# Patient Record
Sex: FEMALE | Race: WHITE | URBAN - METROPOLITAN AREA
[De-identification: names, ages, dates, MRNs, and addresses within clinical notes are randomized per-mention and may not be internally consistent; named-entity substitution may affect disease eponyms.]

---

## 2019-07-31 LAB — CREATININE, EXTERNAL: 1.02

## 2019-12-20 LAB — CREATININE, EXTERNAL: 1.01

## 2020-01-03 ENCOUNTER — HOSPITAL ENCOUNTER (OUTPATIENT)
Dept: LAB | Age: 75
Discharge: HOME | End: 2020-01-03

## 2020-01-03 ENCOUNTER — OFFICE VISIT (OUTPATIENT)
Dept: ENDOCRINOLOGY | Age: 75
End: 2020-01-03

## 2020-01-03 VITALS
BODY MASS INDEX: 33.13 KG/M2 | RESPIRATION RATE: 18 BRPM | OXYGEN SATURATION: 97 % | HEIGHT: 62 IN | HEART RATE: 52 BPM | TEMPERATURE: 96.8 F | SYSTOLIC BLOOD PRESSURE: 136 MMHG | WEIGHT: 180 LBS | DIASTOLIC BLOOD PRESSURE: 65 MMHG

## 2020-01-03 DIAGNOSIS — E03.8 SUBCLINICAL HYPOTHYROIDISM: ICD-10-CM

## 2020-01-03 DIAGNOSIS — E55.9 VITAMIN D DEFICIENCY: ICD-10-CM

## 2020-01-03 DIAGNOSIS — E83.52 HYPERCALCEMIA: ICD-10-CM

## 2020-01-03 DIAGNOSIS — E21.0 HYPERPARATHYROIDISM, PRIMARY (HCC): ICD-10-CM

## 2020-01-03 DIAGNOSIS — E83.52 HYPERCALCEMIA: Primary | ICD-10-CM

## 2020-01-03 RX ORDER — BUMETANIDE 1 MG/1
1 TABLET ORAL DAILY
COMMUNITY
Start: 2020-01-02

## 2020-01-03 RX ORDER — ASPIRIN 81 MG/1
81 TABLET ORAL DAILY
COMMUNITY

## 2020-01-03 RX ORDER — MONTELUKAST SODIUM 10 MG/1
1 TABLET ORAL DAILY
COMMUNITY
Start: 2020-01-02

## 2020-01-03 RX ORDER — IPRATROPIUM BROMIDE AND ALBUTEROL 20; 100 UG/1; UG/1
1 SPRAY, METERED RESPIRATORY (INHALATION)
COMMUNITY
Start: 2020-01-02

## 2020-01-03 RX ORDER — DICYCLOMINE HYDROCHLORIDE 10 MG/1
10 CAPSULE ORAL AS NEEDED
COMMUNITY

## 2020-01-03 RX ORDER — METOPROLOL SUCCINATE 50 MG/1
2 TABLET, EXTENDED RELEASE ORAL DAILY
COMMUNITY
Start: 2020-01-02

## 2020-01-03 RX ORDER — SPIRONOLACTONE 25 MG/1
1 TABLET ORAL DAILY
COMMUNITY
Start: 2020-01-02

## 2020-01-03 RX ORDER — NITROGLYCERIN 0.4 MG/1
TABLET SUBLINGUAL
COMMUNITY
Start: 2020-01-02

## 2020-01-03 RX ORDER — MINERAL OIL
180 ENEMA (ML) RECTAL
COMMUNITY

## 2020-01-03 RX ORDER — CLOPIDOGREL BISULFATE 75 MG/1
1 TABLET ORAL DAILY
COMMUNITY
Start: 2020-01-02

## 2020-01-03 NOTE — PROGRESS NOTES
Catherine Rodriguez MD          Patient Information  Date:1/3/2020  Name : Monisha Jameson 75 y.o.     YOB: 1945         Referred by: Melina Arnold MD       Chief Complaint   Patient presents with   [facility] New Patient     referred for Thyroid and elevated calcium levels       History of present illness:    Monisha Jameson is a 75 y.o. female here for evaluation of hypercalcemia. She was found to have calcium elevation over the last 2 years, calcium has been ranging from 10.2-10.5, upper limit of normal is 10.4. Calcium from December 2019 was 9.8. Mild CKD   when calcium was 10.4 (8.6-10.4), September 2019  She is off vitamin D supplements for the last 6 months. She has also decreased dairy intake  History of coronary artery disease, status post CABG, 3 MIs, status post pacemaker, CHF  On diuretics      No h/o HCTZ,lithium,theophylline usage recently, was on hydrochlorothiazide in the past  No h/o kidney stones,PUD    No h/o osteoporosis,fragility fractures  No family h/o of calcium disorders or nephrolithiasis or [facility]  DXA scan -done by gynecologist    No myalgia  Complains of shortness of breath on exertion, limited activity, weight gain      BP Readings from Last 3 Encounters:   01/03/20 136/65       Wt Readings from Last 3 Encounters:   01/03/20 180 lb (81.6 kg)       Past Medical History:   Diagnosis Date    Asthma 2000    Heart attack ([facility] [address] 2018    x3    History of shingles 04/2018    Hypercalcemia     Hypoglycemia     Vitamin D deficiency        Current Outpatient Medications   Medication Sig    bumetanide (BUMEX) 1 mg tablet Take 1 Tab by mouth daily.  clopidogrel (PLAVIX) 75 mg tab Take 1 Tab by mouth daily.  COMBIVENT RESPIMAT  mcg/actuation inhaler Take 1 Puff by inhalation three (3) times daily as needed.  metoprolol succinate (TOPROL-XL) 50 mg XL tablet Take 2 Tabs by mouth daily.     montelukast (SINGULAIR) 10 mg tablet Take 1 Tab by mouth daily.  nitroglycerin (NITROSTAT) 0.4 mg SL tablet     spironolactone (ALDACTONE) 25 mg tablet Take 1 Tab by mouth daily.  aspirin delayed-release 81 mg tablet Take 81 mg by mouth daily.  dicyclomine (BENTYL) 10 mg capsule Take 10 mg by mouth as needed.  fexofenadine (ALLEGRA) 180 mg tablet Take 180 mg by mouth daily as needed for Allergies.  ferrous fumarate/vit Bcomp,C (SUPER B COMPLEX PO) Take 1 Tab by mouth daily.  cholecalciferol, vitamin D3, (VITAMIN D3 PO) 2 weekly    BIOTIN PO Take 1 Tab by mouth daily. No current facility-administered medications for this visit. Allergies   Allergen Reactions    Ancef [Cefazolin] Hives    Cefprozil Hives    Penicillins Hives    Sulfur Dioxide Swelling    Codeine Nausea and Vomiting    Levofloxacin Myalgia    Rabeprazole Rash    Tape [Adhesive] Other (comments)     blisters         Review of Systems:  - Review of all symptoms negative other than mentioned in HPI    Physical Examination:  Blood pressure 136/65, pulse (!) 52, temperature 96.8 °F (36 °C), temperature source Oral, resp. rate 18, height 5' 1.5\" (1.562 m), weight 180 lb (81.6 kg), SpO2 97 %. - General: pleasant, no distress, good eye contact  - HEENT: no pallor,EOMI  - Neck: supple, no thyromegaly,no lymphadenopathy.   - Cardiovascular: regular, normal rate, normal S1 and S2, no murmurs  - Respiratory: clear to auscultation bilaterally  - Gastrointestinal: soft, nontender, nondistended  - Musculoskeletal: no proximal muscle weakness in upper or lower extremities  - Integumentary: no edema,  - Neurological: alert and oriented,no focal neurological deficits  - Psychiatric: normal mood and affect    Data Reviewed:     No results found for: CA, CAP, PTH, PHOS, PHOSU1  No results found for: VITD3, XQVID2, XQVID3, XQVID, VD3RIA    No results found for: TSH, TSH2, TSH3, TSHP, TSHEXT, TSHEXT  No results found for: NA, K, CL, CO2, AGAP, GLU, BUN, CREA, BYRON Bonds, CA, Gabriela Srivastava    [] Reviewed labs    Assessment/Plan:     Hypercalcemia: Based on the blood test she could have mild primary hyperparathyroidism, calcium levels likely fluctuating depending on fluid volume/hydration. She is on antidiuretics  Calcium has been mildly elevated, parathyroid levels are higher for the mild elevation of calcium, need to check vitamin D, 24-hour urine calcium along with creatinine, repeat calcium with albumin, PTH, need to rule out coexisting secondary causes for elevated PTH also  Requested the DEXA report from gynecologist  No nephrolithiasis, renal parameters stable, no known osteoporosis  At present no indications for surgery    Vitamin D deficiency: We will check vitamin D first, if the levels are normal then she can go ahead and collect 24-hour urine calcium. If vitamin D levels are low, need to replete vitamin D then repeat the blood test as well as collect 24-hour urine calcium, low vitamin D can lower calcium excretion through the urine    CAD:    Subclinical hypothyroidism: Recheck TSH, free T4  TSH is very minimally elevated, no treatment necessary    She spends most of the time in [place], she will have the labs done there at [facility]    Patient Instructions   24 hour urine collection instructions    On the day you plan to collect urine    1. Discard first urine sample soon after you get up    2. Start collecting urine samples in the container then on whole first day . Andrew Cueva ... 3. until the first sample next day is collected into the jar. Wait till you hear from us about the test result before collecting the urine           Thank you for allowing me to participate in the care of this patient. Nayla Hernandez MD        Patient Jeanie Omalley verbalized understanding   Voice-recognition software was used to generate this report, which may result in some phonetic-based errors in the grammar and contents.   Even though attempts were made to correct all the mistakes, some may have been missed and remained in the body of the report.

## 2020-01-03 NOTE — LETTER
1/3/20 Patient: Jacki Arita YOB: 1945 Date of Visit: 1/3/2020 Tracy Foster MD 
[address] [facility] [address] VIA Facsimile: [phone] Dear Tracy Foster MD, Thank you for referring Ms. Thom Sauer to [facility] for evaluation. My notes for this consultation are attached. If you have questions, please do not hesitate to call me. I look forward to following your patient along with you. Sincerely, Sydni Agee MD

## 2020-01-03 NOTE — PATIENT INSTRUCTIONS
24 hour urine collection instructions    On the day you plan to collect urine    1. Discard first urine sample soon after you get up    2. Start collecting urine samples in the container then on whole first day . Thor Estrada ... 3. until the first sample next day is collected into the jar.           Wait till you hear from us about the test result before collecting the urine

## 2020-01-03 NOTE — PROGRESS NOTES
Alvina Jackson is a 75 y.o. female here for   Chief Complaint   Patient presents with    New Patient     referred for Thyroid and elevated calcium levels       1. Have you been to the ER, urgent care clinic since your last visit? Hospitalized since your last visit? -n/a    2. Have you seen or consulted any other health care providers outside of the [address] [place] [address] since your last visit?   Include any pap smears or colon screening.-n/a

## 2020-01-04 LAB — 25(OH)D3 SERPL-MCNC: 22.6 NG/ML (ref 30–100)

## 2020-01-05 NOTE — PROGRESS NOTES
Vitamin D is low, I would recommend vitamin D over-the-counter 1000 units a day. It will take 4 weeks for the vitamin D to improve, after that she can collect the 24-hour urine and complete rest of the blood test at [place].

## 2020-01-07 ENCOUNTER — TELEPHONE (OUTPATIENT)
Dept: ENDOCRINOLOGY | Age: 75
End: 2020-01-07

## 2020-01-07 NOTE — TELEPHONE ENCOUNTER
Per Dr. Carrie Mora, informed pt of result note, as noted above. Pt verbalized understanding and stated it does work out as she has to [employer] surgery soon. She wanted to know if Dr. Carrie Mora was aware that she needed heart surgery for her Mitral valve leaking and losing too much blood, she called up here yesterday. Informed her I do see the msg in here and it was sent to Dr. Carrie Mora. No further questions or concerns at this time.

## 2020-01-07 NOTE — TELEPHONE ENCOUNTER
----- Message from Callie Hart MD sent at 1/4/2020  9:29 PM EST -----  Vitamin D is low, I would recommend vitamin D over-the-counter 1000 units a day. It will take 4 weeks for the vitamin D to improve, after that she can collect the 24-hour urine and complete rest of the blood test at [place].

## 2025-02-06 ENCOUNTER — APPOINTMENT (OUTPATIENT)
Facility: HOSPITAL | Age: 80
DRG: 280 | End: 2025-02-06
Payer: MEDICARE

## 2025-02-06 ENCOUNTER — HOSPITAL ENCOUNTER (INPATIENT)
Facility: HOSPITAL | Age: 80
LOS: 3 days | Discharge: HOME | DRG: 280 | End: 2025-02-10
Attending: STUDENT IN AN ORGANIZED HEALTH CARE EDUCATION/TRAINING PROGRAM | Admitting: INTERNAL MEDICINE
Payer: MEDICARE

## 2025-02-06 DIAGNOSIS — R07.9 CHEST PAIN WITH HIGH RISK FOR CARDIAC ETIOLOGY: ICD-10-CM

## 2025-02-06 DIAGNOSIS — R07.9 CHEST PAIN, UNSPECIFIED TYPE: Primary | ICD-10-CM

## 2025-02-06 LAB
ALBUMIN SERPL-MCNC: 3.2 G/DL (ref 3.5–5)
ALBUMIN/GLOB SERPL: 1 (ref 1.1–2.2)
ALP SERPL-CCNC: 50 U/L (ref 45–117)
ALT SERPL-CCNC: 46 U/L (ref 12–78)
ANION GAP SERPL CALC-SCNC: 7 MMOL/L (ref 2–12)
ANION GAP SERPL CALC-SCNC: 7 MMOL/L (ref 2–12)
AST SERPL W P-5'-P-CCNC: 61 U/L (ref 15–37)
BASE EXCESS BLD CALC-SCNC: 1.4 MMOL/L
BASOPHILS # BLD: 0.05 K/UL (ref 0–0.1)
BASOPHILS NFR BLD: 0.6 % (ref 0–1)
BILIRUB SERPL-MCNC: 0.8 MG/DL (ref 0.2–1)
BUN SERPL-MCNC: 10 MG/DL (ref 6–20)
BUN SERPL-MCNC: 9 MG/DL (ref 6–20)
BUN/CREAT SERPL: 9 (ref 12–20)
BUN/CREAT SERPL: 9 (ref 12–20)
CA-I BLD-MCNC: 1.3 MMOL/L (ref 1.12–1.32)
CA-I BLD-MCNC: 9.8 MG/DL (ref 8.5–10.1)
CA-I BLD-MCNC: 9.8 MG/DL (ref 8.5–10.1)
CHLORIDE BLD-SCNC: 107 MMOL/L (ref 98–107)
CHLORIDE SERPL-SCNC: 111 MMOL/L (ref 97–108)
CHLORIDE SERPL-SCNC: 112 MMOL/L (ref 97–108)
CO2 BLD-SCNC: 26 MMOL/L
CO2 SERPL-SCNC: 22 MMOL/L (ref 21–32)
CO2 SERPL-SCNC: 25 MMOL/L (ref 21–32)
CREAT SERPL-MCNC: 0.99 MG/DL (ref 0.55–1.02)
CREAT SERPL-MCNC: 1.11 MG/DL (ref 0.55–1.02)
CREAT UR-MCNC: 0.86 MG/DL (ref 0.6–1.3)
DIFFERENTIAL METHOD BLD: ABNORMAL
EOSINOPHIL # BLD: 0.14 K/UL (ref 0–0.4)
EOSINOPHIL NFR BLD: 1.6 % (ref 0–7)
ERYTHROCYTE [DISTWIDTH] IN BLOOD BY AUTOMATED COUNT: 15.4 % (ref 11.5–14.5)
FLUAV RNA SPEC QL NAA+PROBE: NOT DETECTED
FLUBV RNA SPEC QL NAA+PROBE: NOT DETECTED
GLOBULIN SER CALC-MCNC: 3.2 G/DL (ref 2–4)
GLUCOSE BLD STRIP.AUTO-MCNC: 201 MG/DL (ref 65–100)
GLUCOSE SERPL-MCNC: 108 MG/DL (ref 65–100)
GLUCOSE SERPL-MCNC: 178 MG/DL (ref 65–100)
HCO3 BLD-SCNC: 26.6 MMOL/L (ref 19–28)
HCT VFR BLD AUTO: 35.6 % (ref 35–47)
HGB BLD-MCNC: 10.5 G/DL (ref 11.5–16)
IMM GRANULOCYTES # BLD AUTO: 0.01 K/UL (ref 0–0.04)
IMM GRANULOCYTES NFR BLD AUTO: 0.1 % (ref 0–0.5)
LACTATE BLD-SCNC: 2.01 MMOL/L (ref 0.4–2)
LYMPHOCYTES # BLD: 2.71 K/UL (ref 0.8–3.5)
LYMPHOCYTES NFR BLD: 31.8 % (ref 12–49)
MAGNESIUM SERPL-MCNC: 2 MG/DL (ref 1.6–2.4)
MCH RBC QN AUTO: 26.4 PG (ref 26–34)
MCHC RBC AUTO-ENTMCNC: 29.5 G/DL (ref 30–36.5)
MCV RBC AUTO: 89.4 FL (ref 80–99)
MONOCYTES # BLD: 0.73 K/UL (ref 0–1)
MONOCYTES NFR BLD: 8.6 % (ref 5–13)
NEUTS SEG # BLD: 4.88 K/UL (ref 1.8–8)
NEUTS SEG NFR BLD: 57.3 % (ref 32–75)
NRBC # BLD: 0 K/UL (ref 0–0.01)
NRBC BLD-RTO: 0 PER 100 WBC
PCO2 BLD: 43.7 MMHG (ref 35–45)
PERFORMED BY:: ABNORMAL
PH BLD: 7.39 (ref 7.35–7.45)
PLATELET # BLD AUTO: 306 K/UL (ref 150–400)
PMV BLD AUTO: 10.7 FL (ref 8.9–12.9)
PO2 BLD: <27 MMHG (ref 75–100)
POTASSIUM BLD-SCNC: 3.4 MMOL/L (ref 3.5–5.5)
POTASSIUM SERPL-SCNC: 3.4 MMOL/L (ref 3.5–5.1)
POTASSIUM SERPL-SCNC: 4.7 MMOL/L (ref 3.5–5.1)
PROT SERPL-MCNC: 6.4 G/DL (ref 6.4–8.2)
RBC # BLD AUTO: 3.98 M/UL (ref 3.8–5.2)
SARS-COV-2 RNA RESP QL NAA+PROBE: NOT DETECTED
SODIUM BLD-SCNC: 146 MMOL/L (ref 136–145)
SODIUM SERPL-SCNC: 141 MMOL/L (ref 136–145)
SODIUM SERPL-SCNC: 143 MMOL/L (ref 136–145)
SPECIMEN SITE: ABNORMAL
TROPONIN I SERPL HS-MCNC: 147 NG/L (ref 0–51)
TROPONIN I SERPL HS-MCNC: 22 NG/L (ref 0–51)
TROPONIN I SERPL HS-MCNC: 74 NG/L (ref 0–51)
WBC # BLD AUTO: 8.5 K/UL (ref 3.6–11)

## 2025-02-06 PROCEDURE — 71045 X-RAY EXAM CHEST 1 VIEW: CPT

## 2025-02-06 PROCEDURE — 80048 BASIC METABOLIC PNL TOTAL CA: CPT

## 2025-02-06 PROCEDURE — 99285 EMERGENCY DEPT VISIT HI MDM: CPT

## 2025-02-06 PROCEDURE — 96374 THER/PROPH/DIAG INJ IV PUSH: CPT

## 2025-02-06 PROCEDURE — 71275 CT ANGIOGRAPHY CHEST: CPT

## 2025-02-06 PROCEDURE — 85025 COMPLETE CBC W/AUTO DIFF WBC: CPT

## 2025-02-06 PROCEDURE — 82947 ASSAY GLUCOSE BLOOD QUANT: CPT

## 2025-02-06 PROCEDURE — 94640 AIRWAY INHALATION TREATMENT: CPT

## 2025-02-06 PROCEDURE — 82330 ASSAY OF CALCIUM: CPT

## 2025-02-06 PROCEDURE — G0378 HOSPITAL OBSERVATION PER HR: HCPCS

## 2025-02-06 PROCEDURE — G0378 HOSPITAL OBSERVATION PER HR: HCPCS | Performed by: INTERNAL MEDICINE

## 2025-02-06 PROCEDURE — 83605 ASSAY OF LACTIC ACID: CPT

## 2025-02-06 PROCEDURE — 82803 BLOOD GASES ANY COMBINATION: CPT

## 2025-02-06 PROCEDURE — 84295 ASSAY OF SERUM SODIUM: CPT

## 2025-02-06 PROCEDURE — 6370000000 HC RX 637 (ALT 250 FOR IP): Performed by: INTERNAL MEDICINE

## 2025-02-06 PROCEDURE — 84132 ASSAY OF SERUM POTASSIUM: CPT

## 2025-02-06 PROCEDURE — 6360000004 HC RX CONTRAST MEDICATION: Performed by: INTERNAL MEDICINE

## 2025-02-06 PROCEDURE — 93005 ELECTROCARDIOGRAM TRACING: CPT | Performed by: STUDENT IN AN ORGANIZED HEALTH CARE EDUCATION/TRAINING PROGRAM

## 2025-02-06 PROCEDURE — 2500000003 HC RX 250 WO HCPCS: Performed by: INTERNAL MEDICINE

## 2025-02-06 PROCEDURE — 83735 ASSAY OF MAGNESIUM: CPT

## 2025-02-06 PROCEDURE — 87636 SARSCOV2 & INF A&B AMP PRB: CPT

## 2025-02-06 PROCEDURE — 80053 COMPREHEN METABOLIC PANEL: CPT

## 2025-02-06 PROCEDURE — 84484 ASSAY OF TROPONIN QUANT: CPT

## 2025-02-06 PROCEDURE — 36415 COLL VENOUS BLD VENIPUNCTURE: CPT

## 2025-02-06 PROCEDURE — 6360000002 HC RX W HCPCS: Performed by: INTERNAL MEDICINE

## 2025-02-06 RX ORDER — IPRATROPIUM BROMIDE AND ALBUTEROL SULFATE 2.5; .5 MG/3ML; MG/3ML
1 SOLUTION RESPIRATORY (INHALATION)
Status: DISCONTINUED | OUTPATIENT
Start: 2025-02-06 | End: 2025-02-07

## 2025-02-06 RX ORDER — VIT B COMPLX C/FOLIC ACID/ZINC 0.8MG-15MG
1 TABLET ORAL NIGHTLY
Status: DISCONTINUED | OUTPATIENT
Start: 2025-02-06 | End: 2025-02-06

## 2025-02-06 RX ORDER — POTASSIUM CHLORIDE 1500 MG/1
40 TABLET, EXTENDED RELEASE ORAL PRN
Status: DISCONTINUED | OUTPATIENT
Start: 2025-02-06 | End: 2025-02-10 | Stop reason: HOSPADM

## 2025-02-06 RX ORDER — METOPROLOL SUCCINATE 25 MG/1
75 TABLET, EXTENDED RELEASE ORAL 2 TIMES DAILY
Status: DISCONTINUED | OUTPATIENT
Start: 2025-02-06 | End: 2025-02-10 | Stop reason: HOSPADM

## 2025-02-06 RX ORDER — ALLOPURINOL 100 MG/1
100 TABLET ORAL DAILY
COMMUNITY

## 2025-02-06 RX ORDER — ENOXAPARIN SODIUM 100 MG/ML
40 INJECTION SUBCUTANEOUS DAILY
Status: DISCONTINUED | OUTPATIENT
Start: 2025-02-07 | End: 2025-02-09

## 2025-02-06 RX ORDER — VIT B COMPLX C/FOLIC ACID/ZINC 0.8MG-15MG
1 TABLET ORAL NIGHTLY
COMMUNITY

## 2025-02-06 RX ORDER — FEXOFENADINE HCL 180 MG/1
180 TABLET ORAL DAILY
COMMUNITY

## 2025-02-06 RX ORDER — MORPHINE SULFATE 2 MG/ML
4 INJECTION, SOLUTION INTRAMUSCULAR; INTRAVENOUS
Status: ACTIVE | OUTPATIENT
Start: 2025-02-06 | End: 2025-02-07

## 2025-02-06 RX ORDER — ONDANSETRON 2 MG/ML
4 INJECTION INTRAMUSCULAR; INTRAVENOUS EVERY 6 HOURS PRN
Status: DISCONTINUED | OUTPATIENT
Start: 2025-02-06 | End: 2025-02-10 | Stop reason: HOSPADM

## 2025-02-06 RX ORDER — ASPIRIN 81 MG/1
81 TABLET ORAL NIGHTLY
Status: DISCONTINUED | OUTPATIENT
Start: 2025-02-06 | End: 2025-02-09

## 2025-02-06 RX ORDER — CETIRIZINE HYDROCHLORIDE 10 MG/1
10 TABLET ORAL DAILY
Status: DISCONTINUED | OUTPATIENT
Start: 2025-02-07 | End: 2025-02-10 | Stop reason: HOSPADM

## 2025-02-06 RX ORDER — LISINOPRIL 5 MG/1
10 TABLET ORAL DAILY
Status: DISCONTINUED | OUTPATIENT
Start: 2025-02-07 | End: 2025-02-10 | Stop reason: HOSPADM

## 2025-02-06 RX ORDER — IPRATROPIUM BROMIDE AND ALBUTEROL SULFATE 2.5; .5 MG/3ML; MG/3ML
1 SOLUTION RESPIRATORY (INHALATION) EVERY 4 HOURS PRN
Status: DISCONTINUED | OUTPATIENT
Start: 2025-02-06 | End: 2025-02-10 | Stop reason: HOSPADM

## 2025-02-06 RX ORDER — POLYETHYLENE GLYCOL 3350 17 G/17G
17 POWDER, FOR SOLUTION ORAL DAILY PRN
Status: DISCONTINUED | OUTPATIENT
Start: 2025-02-06 | End: 2025-02-10 | Stop reason: HOSPADM

## 2025-02-06 RX ORDER — CLOPIDOGREL BISULFATE 75 MG/1
75 TABLET ORAL NIGHTLY
COMMUNITY

## 2025-02-06 RX ORDER — ONDANSETRON 4 MG/1
4 TABLET, ORALLY DISINTEGRATING ORAL EVERY 8 HOURS PRN
Status: DISCONTINUED | OUTPATIENT
Start: 2025-02-06 | End: 2025-02-10 | Stop reason: HOSPADM

## 2025-02-06 RX ORDER — LISINOPRIL 10 MG/1
10 TABLET ORAL DAILY
Status: ON HOLD | COMMUNITY
End: 2025-02-10 | Stop reason: HOSPADM

## 2025-02-06 RX ORDER — CLOPIDOGREL BISULFATE 75 MG/1
75 TABLET ORAL NIGHTLY
Status: DISCONTINUED | OUTPATIENT
Start: 2025-02-06 | End: 2025-02-10 | Stop reason: HOSPADM

## 2025-02-06 RX ORDER — FAMOTIDINE 20 MG/1
1 TABLET, FILM COATED ORAL NIGHTLY PRN
COMMUNITY

## 2025-02-06 RX ORDER — SPIRONOLACTONE 25 MG/1
25 TABLET ORAL DAILY
COMMUNITY

## 2025-02-06 RX ORDER — SODIUM CHLORIDE 9 MG/ML
INJECTION, SOLUTION INTRAVENOUS PRN
Status: DISCONTINUED | OUTPATIENT
Start: 2025-02-06 | End: 2025-02-10 | Stop reason: HOSPADM

## 2025-02-06 RX ORDER — SPIRONOLACTONE 25 MG/1
25 TABLET ORAL DAILY
Status: DISCONTINUED | OUTPATIENT
Start: 2025-02-07 | End: 2025-02-10 | Stop reason: HOSPADM

## 2025-02-06 RX ORDER — FAMOTIDINE 20 MG/1
20 TABLET, FILM COATED ORAL NIGHTLY PRN
Status: DISCONTINUED | OUTPATIENT
Start: 2025-02-06 | End: 2025-02-10 | Stop reason: HOSPADM

## 2025-02-06 RX ORDER — ACETAMINOPHEN 650 MG/1
650 SUPPOSITORY RECTAL EVERY 6 HOURS PRN
Status: DISCONTINUED | OUTPATIENT
Start: 2025-02-06 | End: 2025-02-10 | Stop reason: HOSPADM

## 2025-02-06 RX ORDER — FUROSEMIDE 10 MG/ML
20 INJECTION INTRAMUSCULAR; INTRAVENOUS 2 TIMES DAILY
Status: DISCONTINUED | OUTPATIENT
Start: 2025-02-06 | End: 2025-02-09

## 2025-02-06 RX ORDER — ACETAMINOPHEN 325 MG/1
650 TABLET ORAL EVERY 6 HOURS PRN
Status: DISCONTINUED | OUTPATIENT
Start: 2025-02-06 | End: 2025-02-10 | Stop reason: HOSPADM

## 2025-02-06 RX ORDER — DILTIAZEM HYDROCHLORIDE 120 MG/1
120 CAPSULE, COATED, EXTENDED RELEASE ORAL DAILY
Status: ON HOLD | COMMUNITY
End: 2025-02-10 | Stop reason: HOSPADM

## 2025-02-06 RX ORDER — NITROGLYCERIN 0.4 MG/1
0.4 TABLET SUBLINGUAL EVERY 5 MIN PRN
Status: DISCONTINUED | OUTPATIENT
Start: 2025-02-06 | End: 2025-02-10 | Stop reason: HOSPADM

## 2025-02-06 RX ORDER — SODIUM CHLORIDE 0.9 % (FLUSH) 0.9 %
5-40 SYRINGE (ML) INJECTION EVERY 12 HOURS SCHEDULED
Status: DISCONTINUED | OUTPATIENT
Start: 2025-02-06 | End: 2025-02-10 | Stop reason: HOSPADM

## 2025-02-06 RX ORDER — METOPROLOL SUCCINATE 50 MG/1
1.5 TABLET, EXTENDED RELEASE ORAL 2 TIMES DAILY
Status: ON HOLD | COMMUNITY
Start: 2024-03-04 | End: 2025-02-07 | Stop reason: HOSPADM

## 2025-02-06 RX ORDER — MAGNESIUM SULFATE IN WATER 40 MG/ML
2000 INJECTION, SOLUTION INTRAVENOUS PRN
Status: DISCONTINUED | OUTPATIENT
Start: 2025-02-06 | End: 2025-02-10 | Stop reason: HOSPADM

## 2025-02-06 RX ORDER — ALBUTEROL SULFATE 2.5 MG/.5ML
2.5 SOLUTION RESPIRATORY (INHALATION) EVERY 6 HOURS PRN
Status: DISCONTINUED | OUTPATIENT
Start: 2025-02-06 | End: 2025-02-10 | Stop reason: HOSPADM

## 2025-02-06 RX ORDER — DILTIAZEM HYDROCHLORIDE 120 MG/1
120 CAPSULE, COATED, EXTENDED RELEASE ORAL DAILY
Status: DISCONTINUED | OUTPATIENT
Start: 2025-02-06 | End: 2025-02-10 | Stop reason: HOSPADM

## 2025-02-06 RX ORDER — POTASSIUM CHLORIDE 7.45 MG/ML
10 INJECTION INTRAVENOUS PRN
Status: DISCONTINUED | OUTPATIENT
Start: 2025-02-06 | End: 2025-02-10 | Stop reason: HOSPADM

## 2025-02-06 RX ORDER — DILTIAZEM HYDROCHLORIDE 30 MG/1
30 TABLET, FILM COATED ORAL EVERY 6 HOURS SCHEDULED
Status: DISCONTINUED | OUTPATIENT
Start: 2025-02-06 | End: 2025-02-09

## 2025-02-06 RX ORDER — ASPIRIN 81 MG/1
81 TABLET ORAL NIGHTLY
COMMUNITY

## 2025-02-06 RX ORDER — ALLOPURINOL 100 MG/1
100 TABLET ORAL DAILY
Status: DISCONTINUED | OUTPATIENT
Start: 2025-02-07 | End: 2025-02-10 | Stop reason: HOSPADM

## 2025-02-06 RX ORDER — VITAMIN B COMPLEX
1000 TABLET ORAL DAILY
Status: DISCONTINUED | OUTPATIENT
Start: 2025-02-07 | End: 2025-02-10

## 2025-02-06 RX ORDER — SODIUM CHLORIDE 0.9 % (FLUSH) 0.9 %
5-40 SYRINGE (ML) INJECTION PRN
Status: DISCONTINUED | OUTPATIENT
Start: 2025-02-06 | End: 2025-02-10 | Stop reason: HOSPADM

## 2025-02-06 RX ORDER — IOPAMIDOL 755 MG/ML
100 INJECTION, SOLUTION INTRAVASCULAR
Status: COMPLETED | OUTPATIENT
Start: 2025-02-06 | End: 2025-02-06

## 2025-02-06 RX ADMIN — DILTIAZEM HYDROCHLORIDE 120 MG: 120 CAPSULE, COATED, EXTENDED RELEASE ORAL at 18:12

## 2025-02-06 RX ADMIN — ASPIRIN 81 MG: 81 TABLET, COATED ORAL at 20:22

## 2025-02-06 RX ADMIN — IOPAMIDOL 100 ML: 755 INJECTION, SOLUTION INTRAVENOUS at 16:34

## 2025-02-06 RX ADMIN — IPRATROPIUM BROMIDE AND ALBUTEROL SULFATE 1 DOSE: 2.5; .5 SOLUTION RESPIRATORY (INHALATION) at 22:10

## 2025-02-06 RX ADMIN — FUROSEMIDE 20 MG: 10 INJECTION, SOLUTION INTRAMUSCULAR; INTRAVENOUS at 22:02

## 2025-02-06 RX ADMIN — CLOPIDOGREL BISULFATE 75 MG: 75 TABLET ORAL at 20:22

## 2025-02-06 RX ADMIN — SODIUM CHLORIDE, PRESERVATIVE FREE 10 ML: 5 INJECTION INTRAVENOUS at 20:22

## 2025-02-06 RX ADMIN — DILTIAZEM HYDROCHLORIDE 30 MG: 30 TABLET, FILM COATED ORAL at 21:57

## 2025-02-06 RX ADMIN — METOPROLOL SUCCINATE 75 MG: 25 TABLET, FILM COATED, EXTENDED RELEASE ORAL at 21:57

## 2025-02-06 ASSESSMENT — PAIN SCALES - GENERAL
PAINLEVEL_OUTOF10: 2
PAINLEVEL_OUTOF10: 0

## 2025-02-06 NOTE — H&P
Assessment:    Acute on Chronic Respiratory Failure/Shortness of Breath.  Asthma exacerbation.  Possible Acute Heart Failure with notable pulmonary edema on Chest X-ray.  Back Pain.  Possible anginal equivalent.  NSTEMI; Type II.  Supply/Demand Mismatch in setting of tachycardia/hypoxemia.  History of Coronary Artery Disease.  Initial myocardial infarction in 1961.  Status Post NSTEMI 1/2018.  Status Post CABG 2018.  History of Pacemaker Placement, 2018 post cardiac arrest.  History of Hypertension.  History of Hyperlipidemia.  Patient has not responded to or tolerated statins.  History of Breast Cancer.  Treated with masectomy.  Chronic Kidney Disease.  Peripheral Vascular Disease.  History of bilateral carotid artery disease.    Plan:    Admit.  Respiratory Support.  Standing/p.r.n. bronchodilator therapy.  Check Influenza/Covid.  Trend Troponin.  IV Lasix.  Continue ASA and Plavix.  Continue beta blocker, ace and spironolactone.  DVT prophylaxis: Lovenox.    CODE STATUS: Patient is full code.    Home medications were reviewed.    Time spent: 75 minutes.    Discussion/Medical Decision Making: Patient with numerous medical comorbidities, each with increased risk for mortality and morbidity if left untreated.   I have reviewed patient's presenting subjective and objective findings, as well as all laboratory studies, imaging studies, and vital signs to date as well as treatment rendered  and patient's response to those treatments.  In addition, prior medical, surgical and relevant social and family histories were reviewed.    I have discussed patient's presentation/findings and clinical course to date with ED provider. Given the patient's current clinical presentation, I have a high level of concern for decompensation if discharged from the emergency department and that patient warrants admission to hospital.

## 2025-02-06 NOTE — ED NOTES
ED TO INPATIENT SBAR HANDOFF    Patient Name: Concepción Maki   Preferred Name: Concepción  : 1945  79 y.o.   Family/Caregiver Present: no   Code Status Order: No Order  PO Status: NPO:No  Telemetry Order: Yes  C-SSRS: Risk of Suicide: No Risk  Sitter no   Restraints:     Sepsis Risk Score      Situation  Chief Complaint   Patient presents with    Tachycardia     Pt BIBA from home due to SVT per ems, pt c/o back pain, pt found to be SVT rate of 175's, pr arrivals afib rvr w rate of 121. GCS 15. Cardiac hx per pt. Nitro x1 PTA.      Brief Description of Patient's Condition: Pt brought due to back pain, pt found to be SVT per ems, upon arrival pt afib rvr, pt back pain has resolved. Pt HR around 112 afib on monitor. Pt admit due to 2nd trop elevated. Extensive cardiac hx- mulitple MI's, GCS 15. Ambulatory. CTA PE study done- not resulted @ this time.   Mental Status: oriented and alert  Arrived from:Home  Imaging:   XR CHEST PORTABLE   Final Result      Mild to moderate edema pattern. Hazy right mid to lower lung   atelectasis/airspace disease. Moderate right and small left pleural effusions.          Electronically signed by MC PASTOR      CTA CHEST W WO CONTRAST PE Eval    (Results Pending)     Abnormal labs:   Abnormal Labs Reviewed   BASIC METABOLIC PANEL - Abnormal; Notable for the following components:       Result Value    Potassium 3.4 (*)     Chloride 111 (*)     Glucose 178 (*)     Creatinine 1.11 (*)     BUN/Creatinine Ratio 9 (*)     Est, Glom Filt Rate 50 (*)     All other components within normal limits   CBC WITH AUTO DIFFERENTIAL - Abnormal; Notable for the following components:    Hemoglobin 10.5 (*)     MCHC 29.5 (*)     RDW 15.4 (*)     All other components within normal limits   TROPONIN - Abnormal; Notable for the following components:    Troponin, High Sensitivity 74 (*)     All other components within normal limits   POC BLOOD GAS AND CHEMISTRY - Abnormal; Notable for the following

## 2025-02-06 NOTE — ED PROVIDER NOTES
POC Creatinine 0.86 0.6 - 1.3 MG/DL    eGFR, POC 68 >60 ml/min/1.73m2    Base Excess 1.4 mmol/L    POC HCO3 26.6 19.0 - 28.0 mmol/L    POC TCO2 26 MMOL/L    Source Venous      Performed by: REBECA ROBLES     POC Lactic Acid 2.01 (HH) 0.40 - 2.00 mmol/L   EKG 12 Lead    Collection Time: 02/06/25  1:43 PM   Result Value Ref Range    Ventricular Rate 105 BPM    Atrial Rate 136 BPM    QRS Duration 132 ms    Q-T Interval 396 ms    QTc Calculation (Bazett) 523 ms    R Axis 102 degrees    T Axis 30 degrees    Diagnosis       Atrial fibrillation with rapid ventricular response with premature   ventricular or aberrantly conducted complexes  Right bundle branch block  Anteroseptal infarct , age undetermined  Abnormal ECG  No previous ECGs available  Confirmed by YULIA WONG MD ([id number]) on 2/7/2025 7:40:15 AM     Troponin    Collection Time: 02/06/25  3:03 PM   Result Value Ref Range    Troponin, High Sensitivity 74 (H) 0 - 51 ng/L   Troponin    Collection Time: 02/06/25  6:45 PM   Result Value Ref Range    Troponin, High Sensitivity 147 (HH) 0 - 51 ng/L   Comprehensive Metabolic Panel    Collection Time: 02/06/25  6:45 PM   Result Value Ref Range    Sodium 141 136 - 145 mmol/L    Potassium 4.7 3.5 - 5.1 mmol/L    Chloride 112 (H) 97 - 108 mmol/L    CO2 22 21 - 32 mmol/L    Anion Gap 7 2 - 12 mmol/L    Glucose 108 (H) 65 - 100 mg/dL    BUN 9 6 - 20 mg/dL    Creatinine 0.99 0.55 - 1.02 mg/dL    BUN/Creatinine Ratio 9 (L) 12 - 20      Est, Glom Filt Rate 58 (L) >60 ml/min/1.73m2    Calcium 9.8 8.5 - 10.1 mg/dL    Total Bilirubin 0.8 0.2 - 1.0 mg/dL    AST 61 (H) 15 - 37 U/L    ALT 46 12 - 78 U/L    Alk Phosphatase 50 45 - 117 U/L    Total Protein 6.4 6.4 - 8.2 g/dL    Albumin 3.2 (L) 3.5 - 5.0 g/dL    Globulin 3.2 2.0 - 4.0 g/dL    Albumin/Globulin Ratio 1.0 (L) 1.1 - 2.2     COVID-19 & Influenza Combo    Collection Time: 02/06/25  8:20 PM    Specimen: Nasopharyngeal   Result Value Ref Range    SARS-CoV-2, PCR Not Detected Not

## 2025-02-07 ENCOUNTER — APPOINTMENT (OUTPATIENT)
Facility: HOSPITAL | Age: 80
DRG: 280 | End: 2025-02-07
Attending: INTERNAL MEDICINE
Payer: MEDICARE

## 2025-02-07 ENCOUNTER — APPOINTMENT (OUTPATIENT)
Facility: HOSPITAL | Age: 80
DRG: 280 | End: 2025-02-07
Payer: MEDICARE

## 2025-02-07 PROBLEM — I50.9 ACUTE DECOMPENSATED HEART FAILURE (HCC): Status: ACTIVE | Noted: 2025-02-07

## 2025-02-07 LAB
ALBUMIN SERPL-MCNC: 3.5 G/DL (ref 3.5–5)
ALBUMIN/GLOB SERPL: 1.1 (ref 1.1–2.2)
ALP SERPL-CCNC: 55 U/L (ref 45–117)
ALT SERPL-CCNC: 46 U/L (ref 12–78)
ANION GAP SERPL CALC-SCNC: 9 MMOL/L (ref 2–12)
APPEARANCE FLD: ABNORMAL
AST SERPL W P-5'-P-CCNC: 40 U/L (ref 15–37)
BASOPHILS # BLD: 0.05 K/UL (ref 0–0.1)
BASOPHILS NFR BLD: 0.7 % (ref 0–1)
BILIRUB SERPL-MCNC: 0.9 MG/DL (ref 0.2–1)
BUN SERPL-MCNC: 8 MG/DL (ref 6–20)
BUN/CREAT SERPL: 7 (ref 12–20)
CA-I BLD-MCNC: 10.2 MG/DL (ref 8.5–10.1)
CHLORIDE SERPL-SCNC: 107 MMOL/L (ref 97–108)
CO2 SERPL-SCNC: 27 MMOL/L (ref 21–32)
COLOR FLD: YELLOW
CREAT SERPL-MCNC: 1.07 MG/DL (ref 0.55–1.02)
DIFFERENTIAL METHOD BLD: ABNORMAL
EKG ATRIAL RATE: 131 BPM
EKG ATRIAL RATE: 136 BPM
EKG DIAGNOSIS: NORMAL
EKG DIAGNOSIS: NORMAL
EKG Q-T INTERVAL: 366 MS
EKG Q-T INTERVAL: 396 MS
EKG QRS DURATION: 126 MS
EKG QRS DURATION: 132 MS
EKG QTC CALCULATION (BAZETT): 492 MS
EKG QTC CALCULATION (BAZETT): 523 MS
EKG R AXIS: 102 DEGREES
EKG R AXIS: 119 DEGREES
EKG T AXIS: 30 DEGREES
EKG T AXIS: 35 DEGREES
EKG VENTRICULAR RATE: 105 BPM
EKG VENTRICULAR RATE: 109 BPM
EOSINOPHIL # BLD: 0.17 K/UL (ref 0–0.4)
EOSINOPHIL NFR BLD: 2.2 % (ref 0–7)
ERYTHROCYTE [DISTWIDTH] IN BLOOD BY AUTOMATED COUNT: 15.5 % (ref 11.5–14.5)
GLOBULIN SER CALC-MCNC: 3.3 G/DL (ref 2–4)
GLUCOSE FLD-MCNC: 136 MG/DL
GLUCOSE SERPL-MCNC: 104 MG/DL (ref 65–100)
HCT VFR BLD AUTO: 36.2 % (ref 35–47)
HGB BLD-MCNC: 10.6 G/DL (ref 11.5–16)
IMM GRANULOCYTES # BLD AUTO: 0.02 K/UL (ref 0–0.04)
IMM GRANULOCYTES NFR BLD AUTO: 0.3 % (ref 0–0.5)
LDH FLD L TO P-CCNC: 68 U/L
LYMPHOCYTES # BLD: 1.69 K/UL (ref 0.8–3.5)
LYMPHOCYTES NFR BLD: 22.1 % (ref 12–49)
LYMPHOCYTES NFR FLD: 34 %
MAGNESIUM SERPL-MCNC: 2.2 MG/DL (ref 1.6–2.4)
MCH RBC QN AUTO: 26 PG (ref 26–34)
MCHC RBC AUTO-ENTMCNC: 29.3 G/DL (ref 30–36.5)
MCV RBC AUTO: 88.7 FL (ref 80–99)
MESOTHL CELL NFR FLD: 18 %
MONOCYTES # BLD: 0.96 K/UL (ref 0–1)
MONOCYTES NFR BLD: 12.5 % (ref 5–13)
MONOS+MACROS NFR FLD: 46 %
NEUTROPHILS NFR FLD: 2 %
NEUTS SEG # BLD: 4.76 K/UL (ref 1.8–8)
NEUTS SEG NFR BLD: 62.2 % (ref 32–75)
NRBC # BLD: 0 K/UL (ref 0–0.01)
NRBC BLD-RTO: 0 PER 100 WBC
NUC CELL # FLD: 436 /CU MM
PLATELET # BLD AUTO: 303 K/UL (ref 150–400)
PMV BLD AUTO: 10.5 FL (ref 8.9–12.9)
POTASSIUM SERPL-SCNC: 3.3 MMOL/L (ref 3.5–5.1)
PROT FLD-MCNC: 1.9 G/DL
PROT SERPL-MCNC: 6.8 G/DL (ref 6.4–8.2)
RBC # BLD AUTO: 4.08 M/UL (ref 3.8–5.2)
RBC # FLD: >100 /CU MM
SODIUM SERPL-SCNC: 143 MMOL/L (ref 136–145)
SPECIMEN SOURCE FLD: ABNORMAL
SPECIMEN SOURCE FLD: NORMAL
WBC # BLD AUTO: 7.7 K/UL (ref 3.6–11)

## 2025-02-07 PROCEDURE — 71045 X-RAY EXAM CHEST 1 VIEW: CPT

## 2025-02-07 PROCEDURE — C1729 CATH, DRAINAGE: HCPCS

## 2025-02-07 PROCEDURE — 80053 COMPREHEN METABOLIC PANEL: CPT

## 2025-02-07 PROCEDURE — 83615 LACTATE (LD) (LDH) ENZYME: CPT

## 2025-02-07 PROCEDURE — 82945 GLUCOSE OTHER FLUID: CPT

## 2025-02-07 PROCEDURE — 94640 AIRWAY INHALATION TREATMENT: CPT

## 2025-02-07 PROCEDURE — 83735 ASSAY OF MAGNESIUM: CPT

## 2025-02-07 PROCEDURE — C8929 TTE W OR WO FOL WCON,DOPPLER: HCPCS

## 2025-02-07 PROCEDURE — 87070 CULTURE OTHR SPECIMN AEROBIC: CPT

## 2025-02-07 PROCEDURE — 88112 CYTOPATH CELL ENHANCE TECH: CPT

## 2025-02-07 PROCEDURE — 6370000000 HC RX 637 (ALT 250 FOR IP): Performed by: INTERNAL MEDICINE

## 2025-02-07 PROCEDURE — 2500000003 HC RX 250 WO HCPCS: Performed by: INTERNAL MEDICINE

## 2025-02-07 PROCEDURE — 89050 BODY FLUID CELL COUNT: CPT

## 2025-02-07 PROCEDURE — 6360000004 HC RX CONTRAST MEDICATION

## 2025-02-07 PROCEDURE — G0378 HOSPITAL OBSERVATION PER HR: HCPCS

## 2025-02-07 PROCEDURE — 87205 SMEAR GRAM STAIN: CPT

## 2025-02-07 PROCEDURE — 1100000000 HC RM PRIVATE

## 2025-02-07 PROCEDURE — 88305 TISSUE EXAM BY PATHOLOGIST: CPT

## 2025-02-07 PROCEDURE — 6360000002 HC RX W HCPCS: Performed by: INTERNAL MEDICINE

## 2025-02-07 PROCEDURE — 85025 COMPLETE CBC W/AUTO DIFF WBC: CPT

## 2025-02-07 PROCEDURE — 36415 COLL VENOUS BLD VENIPUNCTURE: CPT

## 2025-02-07 PROCEDURE — 84157 ASSAY OF PROTEIN OTHER: CPT

## 2025-02-07 PROCEDURE — 94761 N-INVAS EAR/PLS OXIMETRY MLT: CPT

## 2025-02-07 RX ORDER — FUROSEMIDE 20 MG/1
20 TABLET ORAL DAILY
Qty: 60 TABLET | Refills: 3 | Status: SHIPPED | OUTPATIENT
Start: 2025-02-07 | End: 2025-02-10 | Stop reason: HOSPADM

## 2025-02-07 RX ORDER — METOPROLOL SUCCINATE 25 MG/1
75 TABLET, EXTENDED RELEASE ORAL 2 TIMES DAILY
Qty: 30 TABLET | Refills: 3 | Status: SHIPPED | OUTPATIENT
Start: 2025-02-07

## 2025-02-07 RX ORDER — POTASSIUM CHLORIDE 1500 MG/1
40 TABLET, EXTENDED RELEASE ORAL ONCE
Status: COMPLETED | OUTPATIENT
Start: 2025-02-07 | End: 2025-02-07

## 2025-02-07 RX ORDER — IPRATROPIUM BROMIDE AND ALBUTEROL SULFATE 2.5; .5 MG/3ML; MG/3ML
1 SOLUTION RESPIRATORY (INHALATION)
Status: DISCONTINUED | OUTPATIENT
Start: 2025-02-07 | End: 2025-02-10 | Stop reason: HOSPADM

## 2025-02-07 RX ORDER — DILTIAZEM HYDROCHLORIDE 30 MG/1
30 TABLET, FILM COATED ORAL 4 TIMES DAILY
Qty: 120 TABLET | Refills: 3 | Status: SHIPPED | OUTPATIENT
Start: 2025-02-07

## 2025-02-07 RX ADMIN — POTASSIUM BICARBONATE 40 MEQ: 782 TABLET, EFFERVESCENT ORAL at 09:36

## 2025-02-07 RX ADMIN — LISINOPRIL 10 MG: 5 TABLET ORAL at 09:38

## 2025-02-07 RX ADMIN — IPRATROPIUM BROMIDE AND ALBUTEROL SULFATE 1 DOSE: 2.5; .5 SOLUTION RESPIRATORY (INHALATION) at 19:59

## 2025-02-07 RX ADMIN — METOPROLOL SUCCINATE 75 MG: 25 TABLET, FILM COATED, EXTENDED RELEASE ORAL at 20:45

## 2025-02-07 RX ADMIN — DILTIAZEM HYDROCHLORIDE 30 MG: 30 TABLET, FILM COATED ORAL at 01:22

## 2025-02-07 RX ADMIN — SPIRONOLACTONE 25 MG: 25 TABLET ORAL at 09:37

## 2025-02-07 RX ADMIN — Medication 1000 UNITS: at 09:39

## 2025-02-07 RX ADMIN — ALLOPURINOL 100 MG: 100 TABLET ORAL at 09:37

## 2025-02-07 RX ADMIN — CLOPIDOGREL BISULFATE 75 MG: 75 TABLET ORAL at 20:45

## 2025-02-07 RX ADMIN — CETIRIZINE HYDROCHLORIDE 10 MG: 10 TABLET, FILM COATED ORAL at 09:38

## 2025-02-07 RX ADMIN — FUROSEMIDE 20 MG: 10 INJECTION, SOLUTION INTRAMUSCULAR; INTRAVENOUS at 18:29

## 2025-02-07 RX ADMIN — ENOXAPARIN SODIUM 40 MG: 100 INJECTION SUBCUTANEOUS at 09:39

## 2025-02-07 RX ADMIN — METOPROLOL SUCCINATE 75 MG: 25 TABLET, FILM COATED, EXTENDED RELEASE ORAL at 09:38

## 2025-02-07 RX ADMIN — POTASSIUM CHLORIDE 20 MEQ: 1500 TABLET, EXTENDED RELEASE ORAL at 14:06

## 2025-02-07 RX ADMIN — SULFUR HEXAFLUORIDE 2 ML: 60.7; .19; .19 INJECTION, POWDER, LYOPHILIZED, FOR SUSPENSION INTRAVENOUS; INTRAVESICAL at 19:23

## 2025-02-07 RX ADMIN — DILTIAZEM HYDROCHLORIDE 30 MG: 30 TABLET, FILM COATED ORAL at 06:46

## 2025-02-07 RX ADMIN — ASPIRIN 81 MG: 81 TABLET, COATED ORAL at 20:45

## 2025-02-07 RX ADMIN — SODIUM CHLORIDE, PRESERVATIVE FREE 10 ML: 5 INJECTION INTRAVENOUS at 14:08

## 2025-02-07 RX ADMIN — IPRATROPIUM BROMIDE AND ALBUTEROL SULFATE 1 DOSE: 2.5; .5 SOLUTION RESPIRATORY (INHALATION) at 08:12

## 2025-02-07 RX ADMIN — DILTIAZEM HYDROCHLORIDE 30 MG: 30 TABLET, FILM COATED ORAL at 14:06

## 2025-02-07 RX ADMIN — DILTIAZEM HYDROCHLORIDE 30 MG: 30 TABLET, FILM COATED ORAL at 18:29

## 2025-02-07 RX ADMIN — FUROSEMIDE 20 MG: 10 INJECTION, SOLUTION INTRAMUSCULAR; INTRAVENOUS at 09:36

## 2025-02-07 RX ADMIN — SODIUM CHLORIDE, PRESERVATIVE FREE 10 ML: 5 INJECTION INTRAVENOUS at 20:45

## 2025-02-07 RX ADMIN — IPRATROPIUM BROMIDE AND ALBUTEROL SULFATE 1 DOSE: 2.5; .5 SOLUTION RESPIRATORY (INHALATION) at 11:29

## 2025-02-07 ASSESSMENT — PAIN SCALES - GENERAL
PAINLEVEL_OUTOF10: 0

## 2025-02-07 NOTE — PLAN OF CARE
Problem: Discharge Planning  Goal: Discharge to home or other facility with appropriate resources  2/7/2025 1423 by Luz Tristan RN  Outcome: Progressing  2/7/2025 0111 by Nicol Chinchilla RN  Outcome: Progressing  Flowsheets  Taken 2/6/2025 1805 by Noelle Guadarrama RN  Discharge to home or other facility with appropriate resources: Identify barriers to discharge with patient and caregiver  Taken 2/6/2025 1751 by Brandi Hernandez RN  Discharge to home or other facility with appropriate resources: Identify barriers to discharge with patient and caregiver     Problem: Pain  Goal: Verbalizes/displays adequate comfort level or baseline comfort level  2/7/2025 1423 by Luz Tristan RN  Outcome: Progressing  2/7/2025 0111 by Nicol Chinchilla RN  Outcome: Progressing     Problem: Safety - Adult  Goal: Free from fall injury  2/7/2025 1423 by Luz Tristan RN  Outcome: Progressing  2/7/2025 0111 by Nicol Chinchilla RN  Outcome: Progressing     Problem: ABCDS Injury Assessment  Goal: Absence of physical injury  2/7/2025 1423 by Luz Tristan RN  Outcome: Progressing  2/7/2025 0111 by Nicol Chinchilla RN  Outcome: Progressing

## 2025-02-07 NOTE — PLAN OF CARE
Problem: Discharge Planning  Goal: Discharge to home or other facility with appropriate resources  2/7/2025 0111 by Nicol Chinchilla RN  Outcome: Progressing  Flowsheets  Taken 2/6/2025 1805 by Noelle Guadarrama RN  Discharge to home or other facility with appropriate resources: Identify barriers to discharge with patient and caregiver  Taken 2/6/2025 1751 by Brandi Hernandez RN  Discharge to home or other facility with appropriate resources: Identify barriers to discharge with patient and caregiver  2/6/2025 1739 by Noelle Guadarrama RN  Outcome: Progressing     Problem: Pain  Goal: Verbalizes/displays adequate comfort level or baseline comfort level  2/7/2025 0111 by Nicol Chinchilla RN  Outcome: Progressing  2/6/2025 1739 by Noelle Guadarrama RN  Outcome: Progressing     Problem: Safety - Adult  Goal: Free from fall injury  Outcome: Progressing     Problem: ABCDS Injury Assessment  Goal: Absence of physical injury  Outcome: Progressing

## 2025-02-07 NOTE — CARE COORDINATION
02/07/25 1115   Service Assessment   Patient Orientation Alert and Oriented   Cognition Alert   History Provided By Patient   Primary Caregiver Self   Support Systems Spouse/Significant Other;Family Members;Children   Patient's Healthcare Decision Maker is: Legal Next of Kin   PCP Verified by CM Yes   Last Visit to PCP Within last 3 months   Prior Functional Level Independent in ADLs/IADLs   Current Functional Level Independent in ADLs/IADLs   Can patient return to prior living arrangement Yes   Ability to make needs known: Good   Family able to assist with home care needs: Yes   Would you like for me to discuss the discharge plan with any other family members/significant others, and if so, who? No   Financial Resources Medicare   Community Resources None   CM/SW Referral Other (see comment)   Social/Functional History   Lives With Spouse   Type of Home House   Home Layout Two level   Home Access Stairs to enter with rails   Entrance Stairs - Number of Steps 1   Home Equipment Rollator;Cane   Receives Help From Family   Prior Level of Assist for ADLs Independent   Ambulation Assistance Independent   Prior Level of Assist for Transfers Independent   Active  No   Patient's  Info children   Mode of Transportation Car     Patient currently lives in a two story house with one step to enter, address on chart confirmed.    Patient uses a cane at baseline, has a rollator if needed, reports independent in ADL's and IADL's.  Patient has a chair lift in her home from first to second floor.    Patient is current with listed PCP, does not drive, children transport to [facility] as needed.  Patient uses "[facility]" on [address] in [place] for pharmacy, no reported issues obtaining medications.    MARJORIE discussed HH with patient, she declined at this time, stated she did not feel she needed it.    Advance Care Planning     General Advance Care Planning (ACP) Conversation    Date of Conversation: 2/7/2025  Conducted with:

## 2025-02-07 NOTE — OR NURSING
Patient to IR For Thoracentesis  Patient identified, procedure verified  Consent obtained, site prepped  Procedure complete without complication  800 ml clear yellow fluid drained from RIGHT pleural space  Specimens to lab as ordered  Patient tolerated procedure  Post imaging obtained  Transported back to nursing unit

## 2025-02-08 ENCOUNTER — APPOINTMENT (OUTPATIENT)
Facility: HOSPITAL | Age: 80
DRG: 280 | End: 2025-02-08
Payer: MEDICARE

## 2025-02-08 LAB
ALBUMIN SERPL-MCNC: 3 G/DL (ref 3.5–5)
ALBUMIN/GLOB SERPL: 1 (ref 1.1–2.2)
ALP SERPL-CCNC: 47 U/L (ref 45–117)
ALT SERPL-CCNC: 35 U/L (ref 12–78)
ANION GAP SERPL CALC-SCNC: 8 MMOL/L (ref 2–12)
AST SERPL W P-5'-P-CCNC: 26 U/L (ref 15–37)
BASOPHILS # BLD: 0.03 K/UL (ref 0–0.1)
BASOPHILS NFR BLD: 0.5 % (ref 0–1)
BILIRUB SERPL-MCNC: 1 MG/DL (ref 0.2–1)
BNP SERPL-MCNC: 4055 PG/ML
BUN SERPL-MCNC: 10 MG/DL (ref 6–20)
BUN/CREAT SERPL: 9 (ref 12–20)
CA-I BLD-MCNC: 10 MG/DL (ref 8.5–10.1)
CHLORIDE SERPL-SCNC: 107 MMOL/L (ref 97–108)
CO2 SERPL-SCNC: 27 MMOL/L (ref 21–32)
CREAT SERPL-MCNC: 1.15 MG/DL (ref 0.55–1.02)
DIFFERENTIAL METHOD BLD: ABNORMAL
ECHO AO ASC DIAM: 2.4 CM
ECHO AO ASCENDING AORTA INDEX: 1.37 CM/M2
ECHO AO ROOT DIAM: 2.7 CM
ECHO AO ROOT INDEX: 1.54 CM/M2
ECHO AV AREA PEAK VELOCITY: 1.1 CM2
ECHO AV AREA VTI: 1.1 CM2
ECHO AV AREA/BSA PEAK VELOCITY: 0.6 CM2/M2
ECHO AV AREA/BSA VTI: 0.6 CM2/M2
ECHO AV MEAN GRADIENT: 3 MMHG
ECHO AV MEAN VELOCITY: 0.8 M/S
ECHO AV PEAK GRADIENT: 6 MMHG
ECHO AV PEAK VELOCITY: 1.2 M/S
ECHO AV VELOCITY RATIO: 0.42
ECHO AV VTI: 18.8 CM
ECHO BSA: 1.86 M2
ECHO EST RA PRESSURE: 8 MMHG
ECHO LA AREA 4C: 18.3 CM2
ECHO LA DIAMETER INDEX: 2.69 CM/M2
ECHO LA DIAMETER: 4.7 CM
ECHO LA MAJOR AXIS: 5.1 CM
ECHO LA TO AORTIC ROOT RATIO: 1.74
ECHO LA VOL MOD A4C: 52 ML (ref 22–52)
ECHO LA VOLUME INDEX MOD A4C: 30 ML/M2 (ref 16–34)
ECHO LV E' LATERAL VELOCITY: 9.98 CM/S
ECHO LV E' SEPTAL VELOCITY: 5.4 CM/S
ECHO LV EDV A4C: 122 ML
ECHO LV EDV INDEX A4C: 70 ML/M2
ECHO LV EF PHYSICIAN: 20 %
ECHO LV EJECTION FRACTION A4C: 51 %
ECHO LV ESV A4C: 60 ML
ECHO LV ESV INDEX A4C: 34 ML/M2
ECHO LV FRACTIONAL SHORTENING: 16 % (ref 28–44)
ECHO LV INTERNAL DIMENSION DIASTOLE INDEX: 2.86 CM/M2
ECHO LV INTERNAL DIMENSION DIASTOLIC: 5 CM (ref 3.9–5.3)
ECHO LV INTERNAL DIMENSION SYSTOLIC INDEX: 2.4 CM/M2
ECHO LV INTERNAL DIMENSION SYSTOLIC: 4.2 CM
ECHO LV IVSD: 0.8 CM (ref 0.6–0.9)
ECHO LV MASS 2D: 146.8 G (ref 67–162)
ECHO LV MASS INDEX 2D: 83.9 G/M2 (ref 43–95)
ECHO LV POSTERIOR WALL DIASTOLIC: 0.9 CM (ref 0.6–0.9)
ECHO LV RELATIVE WALL THICKNESS RATIO: 0.36
ECHO LVOT AREA: 2.8 CM2
ECHO LVOT AV VTI INDEX: 0.39
ECHO LVOT DIAM: 1.9 CM
ECHO LVOT MEAN GRADIENT: 0 MMHG
ECHO LVOT PEAK GRADIENT: 1 MMHG
ECHO LVOT PEAK VELOCITY: 0.5 M/S
ECHO LVOT STROKE VOLUME INDEX: 11.8 ML/M2
ECHO LVOT SV: 20.7 ML
ECHO LVOT VTI: 7.3 CM
ECHO MV A VELOCITY: 0.79 M/S
ECHO MV E DECELERATION TIME (DT): 202 MS
ECHO MV E VELOCITY: 1.73 M/S
ECHO MV E/A RATIO: 2.19
ECHO MV E/E' LATERAL: 17.33
ECHO MV E/E' RATIO (AVERAGED): 24.69
ECHO MV E/E' SEPTAL: 32.04
ECHO MV REGURGITANT PEAK GRADIENT: 4 MMHG
ECHO MV REGURGITANT PEAK VELOCITY: 1 M/S
ECHO MV REGURGITANT VTIA: 20.3 CM
ECHO PV MAX VELOCITY: 1.4 M/S
ECHO PV MEAN GRADIENT: 4 MMHG
ECHO PV MEAN VELOCITY: 0.9 M/S
ECHO PV PEAK GRADIENT: 8 MMHG
ECHO PV VTI: 26.6 CM
ECHO RA AREA 4C: 11.2 CM2
ECHO RA END SYSTOLIC VOLUME APICAL 4 CHAMBER INDEX BSA: 14 ML/M2
ECHO RA VOLUME: 25 ML
ECHO RIGHT VENTRICULAR SYSTOLIC PRESSURE (RVSP): 17 MMHG
ECHO RV FREE WALL PEAK S': 6.7 CM/S
ECHO RV MID DIMENSION: 2.2 CM
ECHO TV REGURGITANT MAX VELOCITY: 1.5 M/S
ECHO TV REGURGITANT PEAK GRADIENT: 9 MMHG
EOSINOPHIL # BLD: 0.29 K/UL (ref 0–0.4)
EOSINOPHIL NFR BLD: 4.6 % (ref 0–7)
ERYTHROCYTE [DISTWIDTH] IN BLOOD BY AUTOMATED COUNT: 15.9 % (ref 11.5–14.5)
GLOBULIN SER CALC-MCNC: 3 G/DL (ref 2–4)
GLUCOSE SERPL-MCNC: 97 MG/DL (ref 65–100)
HCT VFR BLD AUTO: 32.8 % (ref 35–47)
HGB BLD-MCNC: 10.1 G/DL (ref 11.5–16)
IMM GRANULOCYTES # BLD AUTO: 0.01 K/UL (ref 0–0.04)
IMM GRANULOCYTES NFR BLD AUTO: 0.2 % (ref 0–0.5)
LACTATE SERPL-SCNC: 1.2 MMOL/L (ref 0.4–2)
LV EF: 25 ML
LYMPHOCYTES # BLD: 1.73 K/UL (ref 0.8–3.5)
LYMPHOCYTES NFR BLD: 27.6 % (ref 12–49)
M PNEUMO IGM SER IA-ACNC: NONREACTIVE
MAGNESIUM SERPL-MCNC: 2.1 MG/DL (ref 1.6–2.4)
MCH RBC QN AUTO: 26.9 PG (ref 26–34)
MCHC RBC AUTO-ENTMCNC: 30.8 G/DL (ref 30–36.5)
MCV RBC AUTO: 87.5 FL (ref 80–99)
MONOCYTES # BLD: 0.91 K/UL (ref 0–1)
MONOCYTES NFR BLD: 14.5 % (ref 5–13)
NEUTS SEG # BLD: 3.29 K/UL (ref 1.8–8)
NEUTS SEG NFR BLD: 52.6 % (ref 32–75)
NRBC # BLD: 0 K/UL (ref 0–0.01)
NRBC BLD-RTO: 0 PER 100 WBC
PHOSPHATE SERPL-MCNC: 3.6 MG/DL (ref 2.6–4.7)
PLATELET # BLD AUTO: 271 K/UL (ref 150–400)
PMV BLD AUTO: 10.5 FL (ref 8.9–12.9)
POTASSIUM SERPL-SCNC: 3.8 MMOL/L (ref 3.5–5.1)
PROT SERPL-MCNC: 6 G/DL (ref 6.4–8.2)
RBC # BLD AUTO: 3.75 M/UL (ref 3.8–5.2)
SODIUM SERPL-SCNC: 142 MMOL/L (ref 136–145)
TROPONIN I SERPL HS-MCNC: 55 NG/L (ref 0–51)
WBC # BLD AUTO: 6.3 K/UL (ref 3.6–11)

## 2025-02-08 PROCEDURE — 83605 ASSAY OF LACTIC ACID: CPT

## 2025-02-08 PROCEDURE — 83735 ASSAY OF MAGNESIUM: CPT

## 2025-02-08 PROCEDURE — 6370000000 HC RX 637 (ALT 250 FOR IP): Performed by: INTERNAL MEDICINE

## 2025-02-08 PROCEDURE — 71045 X-RAY EXAM CHEST 1 VIEW: CPT

## 2025-02-08 PROCEDURE — 2500000003 HC RX 250 WO HCPCS: Performed by: INTERNAL MEDICINE

## 2025-02-08 PROCEDURE — 1100000000 HC RM PRIVATE

## 2025-02-08 PROCEDURE — 84100 ASSAY OF PHOSPHORUS: CPT

## 2025-02-08 PROCEDURE — 80053 COMPREHEN METABOLIC PANEL: CPT

## 2025-02-08 PROCEDURE — 83880 ASSAY OF NATRIURETIC PEPTIDE: CPT

## 2025-02-08 PROCEDURE — 86738 MYCOPLASMA ANTIBODY: CPT

## 2025-02-08 PROCEDURE — 85025 COMPLETE CBC W/AUTO DIFF WBC: CPT

## 2025-02-08 PROCEDURE — 84484 ASSAY OF TROPONIN QUANT: CPT

## 2025-02-08 PROCEDURE — 36415 COLL VENOUS BLD VENIPUNCTURE: CPT

## 2025-02-08 PROCEDURE — 6360000002 HC RX W HCPCS: Performed by: INTERNAL MEDICINE

## 2025-02-08 PROCEDURE — 94640 AIRWAY INHALATION TREATMENT: CPT

## 2025-02-08 RX ADMIN — CETIRIZINE HYDROCHLORIDE 10 MG: 10 TABLET, FILM COATED ORAL at 08:15

## 2025-02-08 RX ADMIN — DILTIAZEM HYDROCHLORIDE 30 MG: 30 TABLET, FILM COATED ORAL at 00:14

## 2025-02-08 RX ADMIN — FUROSEMIDE 20 MG: 10 INJECTION, SOLUTION INTRAMUSCULAR; INTRAVENOUS at 08:15

## 2025-02-08 RX ADMIN — IPRATROPIUM BROMIDE AND ALBUTEROL SULFATE 1 DOSE: 2.5; .5 SOLUTION RESPIRATORY (INHALATION) at 14:22

## 2025-02-08 RX ADMIN — DILTIAZEM HYDROCHLORIDE 30 MG: 30 TABLET, FILM COATED ORAL at 13:16

## 2025-02-08 RX ADMIN — DILTIAZEM HYDROCHLORIDE 30 MG: 30 TABLET, FILM COATED ORAL at 18:35

## 2025-02-08 RX ADMIN — CLOPIDOGREL BISULFATE 75 MG: 75 TABLET ORAL at 20:44

## 2025-02-08 RX ADMIN — Medication 1000 UNITS: at 08:15

## 2025-02-08 RX ADMIN — ENOXAPARIN SODIUM 40 MG: 100 INJECTION SUBCUTANEOUS at 08:15

## 2025-02-08 RX ADMIN — METOPROLOL SUCCINATE 75 MG: 25 TABLET, FILM COATED, EXTENDED RELEASE ORAL at 20:44

## 2025-02-08 RX ADMIN — FUROSEMIDE 20 MG: 10 INJECTION, SOLUTION INTRAMUSCULAR; INTRAVENOUS at 18:35

## 2025-02-08 RX ADMIN — IPRATROPIUM BROMIDE AND ALBUTEROL SULFATE 1 DOSE: 2.5; .5 SOLUTION RESPIRATORY (INHALATION) at 07:56

## 2025-02-08 RX ADMIN — METOPROLOL SUCCINATE 75 MG: 25 TABLET, FILM COATED, EXTENDED RELEASE ORAL at 08:15

## 2025-02-08 RX ADMIN — ALLOPURINOL 100 MG: 100 TABLET ORAL at 08:15

## 2025-02-08 RX ADMIN — IPRATROPIUM BROMIDE AND ALBUTEROL SULFATE 1 DOSE: 2.5; .5 SOLUTION RESPIRATORY (INHALATION) at 20:41

## 2025-02-08 RX ADMIN — SPIRONOLACTONE 25 MG: 25 TABLET ORAL at 08:15

## 2025-02-08 RX ADMIN — SODIUM CHLORIDE, PRESERVATIVE FREE 10 ML: 5 INJECTION INTRAVENOUS at 08:16

## 2025-02-08 RX ADMIN — SODIUM CHLORIDE, PRESERVATIVE FREE 10 ML: 5 INJECTION INTRAVENOUS at 20:45

## 2025-02-08 RX ADMIN — ASPIRIN 81 MG: 81 TABLET, COATED ORAL at 20:44

## 2025-02-08 RX ADMIN — DILTIAZEM HYDROCHLORIDE 30 MG: 30 TABLET, FILM COATED ORAL at 05:54

## 2025-02-08 RX ADMIN — LISINOPRIL 10 MG: 5 TABLET ORAL at 08:15

## 2025-02-08 NOTE — PLAN OF CARE
Problem: Discharge Planning  Goal: Discharge to home or other facility with appropriate resources  2/8/2025 1150 by Tara Irizarry, RN  Outcome: Progressing  2/7/2025 2353 by Keren Dejesus, RN  Outcome: Progressing  Flowsheets (Taken 2/7/2025 2010)  Discharge to home or other facility with appropriate resources: Identify barriers to discharge with patient and caregiver     Problem: Pain  Goal: Verbalizes/displays adequate comfort level or baseline comfort level  Outcome: Progressing     Problem: Safety - Adult  Goal: Free from fall injury  2/8/2025 1150 by Tara Irizarry, RN  Outcome: Progressing  2/7/2025 2353 by Keren Dejesus, RN  Outcome: Progressing     Problem: ABCDS Injury Assessment  Goal: Absence of physical injury  Outcome: Progressing

## 2025-02-09 LAB
ALBUMIN SERPL-MCNC: 3.6 G/DL (ref 3.5–5)
ALBUMIN/GLOB SERPL: 1 (ref 1.1–2.2)
ALP SERPL-CCNC: 56 U/L (ref 45–117)
ALT SERPL-CCNC: 35 U/L (ref 12–78)
ANION GAP SERPL CALC-SCNC: 4 MMOL/L (ref 2–12)
AST SERPL W P-5'-P-CCNC: 24 U/L (ref 15–37)
BASOPHILS # BLD: 0.05 K/UL (ref 0–0.1)
BASOPHILS NFR BLD: 0.6 % (ref 0–1)
BILIRUB SERPL-MCNC: 0.9 MG/DL (ref 0.2–1)
BUN SERPL-MCNC: 14 MG/DL (ref 6–20)
BUN/CREAT SERPL: 9 (ref 12–20)
CA-I BLD-MCNC: 10.7 MG/DL (ref 8.5–10.1)
CHLORIDE SERPL-SCNC: 103 MMOL/L (ref 97–108)
CO2 SERPL-SCNC: 29 MMOL/L (ref 21–32)
CREAT SERPL-MCNC: 1.58 MG/DL (ref 0.55–1.02)
DIFFERENTIAL METHOD BLD: ABNORMAL
EOSINOPHIL # BLD: 0.41 K/UL (ref 0–0.4)
EOSINOPHIL NFR BLD: 5.2 % (ref 0–7)
ERYTHROCYTE [DISTWIDTH] IN BLOOD BY AUTOMATED COUNT: 16.1 % (ref 11.5–14.5)
GLOBULIN SER CALC-MCNC: 3.5 G/DL (ref 2–4)
GLUCOSE SERPL-MCNC: 107 MG/DL (ref 65–100)
HCT VFR BLD AUTO: 37.2 % (ref 35–47)
HGB BLD-MCNC: 11.1 G/DL (ref 11.5–16)
IMM GRANULOCYTES # BLD AUTO: 0.03 K/UL (ref 0–0.04)
IMM GRANULOCYTES NFR BLD AUTO: 0.4 % (ref 0–0.5)
LYMPHOCYTES # BLD: 2.41 K/UL (ref 0.8–3.5)
LYMPHOCYTES NFR BLD: 30.7 % (ref 12–49)
MAGNESIUM SERPL-MCNC: 2.4 MG/DL (ref 1.6–2.4)
MCH RBC QN AUTO: 26.3 PG (ref 26–34)
MCHC RBC AUTO-ENTMCNC: 29.8 G/DL (ref 30–36.5)
MCV RBC AUTO: 88.2 FL (ref 80–99)
MONOCYTES # BLD: 0.92 K/UL (ref 0–1)
MONOCYTES NFR BLD: 11.7 % (ref 5–13)
NEUTS SEG # BLD: 4.04 K/UL (ref 1.8–8)
NEUTS SEG NFR BLD: 51.4 % (ref 32–75)
NRBC # BLD: 0 K/UL (ref 0–0.01)
NRBC BLD-RTO: 0 PER 100 WBC
PHOSPHATE SERPL-MCNC: 3.8 MG/DL (ref 2.6–4.7)
PLATELET # BLD AUTO: 339 K/UL (ref 150–400)
PMV BLD AUTO: 10.9 FL (ref 8.9–12.9)
POTASSIUM SERPL-SCNC: 4.2 MMOL/L (ref 3.5–5.1)
PROT SERPL-MCNC: 7.1 G/DL (ref 6.4–8.2)
RBC # BLD AUTO: 4.22 M/UL (ref 3.8–5.2)
SODIUM SERPL-SCNC: 136 MMOL/L (ref 136–145)
WBC # BLD AUTO: 7.9 K/UL (ref 3.6–11)

## 2025-02-09 PROCEDURE — 85025 COMPLETE CBC W/AUTO DIFF WBC: CPT

## 2025-02-09 PROCEDURE — 80053 COMPREHEN METABOLIC PANEL: CPT

## 2025-02-09 PROCEDURE — 6370000000 HC RX 637 (ALT 250 FOR IP): Performed by: INTERNAL MEDICINE

## 2025-02-09 PROCEDURE — 6360000002 HC RX W HCPCS: Performed by: INTERNAL MEDICINE

## 2025-02-09 PROCEDURE — 94640 AIRWAY INHALATION TREATMENT: CPT

## 2025-02-09 PROCEDURE — 1100000000 HC RM PRIVATE

## 2025-02-09 PROCEDURE — 36415 COLL VENOUS BLD VENIPUNCTURE: CPT

## 2025-02-09 PROCEDURE — 83735 ASSAY OF MAGNESIUM: CPT

## 2025-02-09 PROCEDURE — 83970 ASSAY OF PARATHORMONE: CPT

## 2025-02-09 PROCEDURE — 2500000003 HC RX 250 WO HCPCS: Performed by: INTERNAL MEDICINE

## 2025-02-09 PROCEDURE — 94761 N-INVAS EAR/PLS OXIMETRY MLT: CPT

## 2025-02-09 PROCEDURE — 84100 ASSAY OF PHOSPHORUS: CPT

## 2025-02-09 RX ADMIN — IPRATROPIUM BROMIDE AND ALBUTEROL SULFATE 1 DOSE: 2.5; .5 SOLUTION RESPIRATORY (INHALATION) at 07:13

## 2025-02-09 RX ADMIN — SODIUM CHLORIDE, PRESERVATIVE FREE 10 ML: 5 INJECTION INTRAVENOUS at 09:27

## 2025-02-09 RX ADMIN — Medication 1000 UNITS: at 09:18

## 2025-02-09 RX ADMIN — FUROSEMIDE 20 MG: 10 INJECTION, SOLUTION INTRAMUSCULAR; INTRAVENOUS at 09:19

## 2025-02-09 RX ADMIN — DILTIAZEM HYDROCHLORIDE 30 MG: 30 TABLET, FILM COATED ORAL at 00:27

## 2025-02-09 RX ADMIN — CETIRIZINE HYDROCHLORIDE 10 MG: 10 TABLET, FILM COATED ORAL at 09:18

## 2025-02-09 RX ADMIN — DILTIAZEM HYDROCHLORIDE 30 MG: 30 TABLET, FILM COATED ORAL at 05:32

## 2025-02-09 RX ADMIN — METOPROLOL SUCCINATE 75 MG: 25 TABLET, FILM COATED, EXTENDED RELEASE ORAL at 20:42

## 2025-02-09 RX ADMIN — APIXABAN 2.5 MG: 2.5 TABLET, FILM COATED ORAL at 20:42

## 2025-02-09 RX ADMIN — APIXABAN 2.5 MG: 2.5 TABLET, FILM COATED ORAL at 12:46

## 2025-02-09 RX ADMIN — IPRATROPIUM BROMIDE AND ALBUTEROL SULFATE 1 DOSE: 2.5; .5 SOLUTION RESPIRATORY (INHALATION) at 19:56

## 2025-02-09 RX ADMIN — IPRATROPIUM BROMIDE AND ALBUTEROL SULFATE 1 DOSE: 2.5; .5 SOLUTION RESPIRATORY (INHALATION) at 14:18

## 2025-02-09 RX ADMIN — ALLOPURINOL 100 MG: 100 TABLET ORAL at 09:17

## 2025-02-09 RX ADMIN — SODIUM CHLORIDE, PRESERVATIVE FREE 10 ML: 5 INJECTION INTRAVENOUS at 20:43

## 2025-02-09 RX ADMIN — METOPROLOL SUCCINATE 75 MG: 25 TABLET, FILM COATED, EXTENDED RELEASE ORAL at 09:17

## 2025-02-09 RX ADMIN — CLOPIDOGREL BISULFATE 75 MG: 75 TABLET ORAL at 20:42

## 2025-02-09 RX ADMIN — LISINOPRIL 10 MG: 5 TABLET ORAL at 09:20

## 2025-02-09 RX ADMIN — ENOXAPARIN SODIUM 40 MG: 100 INJECTION SUBCUTANEOUS at 09:21

## 2025-02-09 NOTE — CONSULTS
Renal Consultation Note    NAME:  Concepción Maki   :   1945   MRN:   [id number]     ATTENDING: Trudy Greene MD  PCP:  Torsten Herrera MD    Date/Time:  2025 3:29 PM      Subjective:   REQUESTING PHYSICIAN:  REASON FOR CONSULT:    ABY    Patient is a very pleasant 79-year-old  female with history of hypertension obesity asthma A-fib status post permanent pacemaker, CAD s/p CABG who presented to the hospital because of shortness of breath.  On admission she had testing for COVID and influenza both were negative.  She is has a CT of the chest on  which did not show any acute PE.  Creatinine was 0.9 which progressively increased to 1.1 yesterday and 1.58 today which prompted a renal consultation.  She was also noted to have a calcium of 10.7.  proBNP 4000.  She was on Lasix 20 mg IV twice daily which was held this morning because of worsening renal function.  Patient seen in the room.  She is comfortable and denies any shortness of breath.  Blood pressure noted to be low with diastolic in the 40s range.  She is on lisinopril 10 mg daily which was started on , metoprolol 75 mg twice daily for blood pressure control.  Also on spironolactone 25 mg daily which was started on .      Past Medical History:   Diagnosis Date    Asthma 2000    Heart attack (HCC) 2018    x3    History of shingles 2018    Hypercalcemia     Hypoglycemia     Vitamin D deficiency       Past Surgical History:   Procedure Laterality Date    ABLATION OF DYSRHYTHMIC FOCUS  2005    BACK SURGERY      CATARACT REMOVAL Bilateral     RIGHT EYE 2013 LEFT EYE 2013    CHOLECYSTECTOMY  2007    25 STONES    CORONARY ANGIOPLASTY WITH STENT PLACEMENT  2018    CORONARY ARTERY BYPASS GRAFT  2018    CYST REMOVAL Left     kidney    KNEE SURGERY Right 2017    LUMBAR FUSION      L4-5    MASTECTOMY Right     ORTHOPEDIC SURGERY Left     PACEMAKER PLACEMENT  
Pulmonology Consult    Subjective:   Consult Note: 2/7/2025 12:17 PM    Chief Complaint:   Chief Complaint   Patient presents with    Tachycardia     Pt BIBA from home due to SVT per ems, pt c/o back pain, pt found to be SVT rate of 175's, pr arrivals afib rvr w rate of 121. GCS 15. Cardiac hx per pt. Nitro x1 PTA.         This patient has been seen and evaluated at the request of Dr. Greene.     Patient is an 79-year-old  female with a history of obesity, hypertension, seasonal allergies, reported asthma, atrial fibrillation status post permanent pacemaker, and CAD status post prior CABG who presented to the hospital with increasing shortness of breath.  Patient seen and examined in her room on the floor this afternoon, case discussed in detail with the patient, family member at the bedside, and the primary team.  Presented the hospital on 2/6/2025 with increasing shortness of breath, she was not sure if it was congestive heart failure or related to her asthma.  Denies any recent wheezing or productive cough, no known sick contacts.  Tested negative for COVID/influenza on admission, mycoplasma testing pending.  VBG on arrival 7.3 9/43/27, so no evidence of significant hypercarbia.  CBC stable, potassium 3.3, lactate mildly elevated 2.01 on admission, repeat lactate in the morning.  Getting oral KCl repletion today, repeat electrolytes in the morning.  AST 65 on admission, has trended down to 40, repeat LFTs in the morning.  Suspect secondary to congestive hepatopathy.  Troponin has steadily trended up to 147, keep trending until coming back down, but doubt ACS, suspect due to CHF.  Chest x-ray admission showed mild to moderate pulmonary edema, repeat chest x-ray in the morning.  CTA chest with no PE, bilateral pulmonary edema, a moderate right-sided and small left-sided pleural effusions, and a right adrenal nodule.  Consulting IR for right-sided diagnostic/therapeutic thoracentesis, and all the 
Alk Phosphatase 50 45 - 117 U/L    Total Protein 6.4 6.4 - 8.2 g/dL    Albumin 3.2 (L) 3.5 - 5.0 g/dL    Globulin 3.2 2.0 - 4.0 g/dL    Albumin/Globulin Ratio 1.0 (L) 1.1 - 2.2     COVID-19 & Influenza Combo    Collection Time: 02/06/25  8:20 PM    Specimen: Nasopharyngeal   Result Value Ref Range    SARS-CoV-2, PCR Not Detected Not Detected      Rapid Influenza A By PCR Not Detected Not Detected      Rapid Influenza B By PCR Not Detected Not Detected     Comprehensive Metabolic Panel w/ Reflex to MG    Collection Time: 02/07/25  5:26 AM   Result Value Ref Range    Sodium 143 136 - 145 mmol/L    Potassium 3.3 (L) 3.5 - 5.1 mmol/L    Chloride 107 97 - 108 mmol/L    CO2 27 21 - 32 mmol/L    Anion Gap 9 2 - 12 mmol/L    Glucose 104 (H) 65 - 100 mg/dL    BUN 8 6 - 20 mg/dL    Creatinine 1.07 (H) 0.55 - 1.02 mg/dL    BUN/Creatinine Ratio 7 (L) 12 - 20      Est, Glom Filt Rate 53 (L) >60 ml/min/1.73m2    Calcium 10.2 (H) 8.5 - 10.1 mg/dL    Total Bilirubin 0.9 0.2 - 1.0 mg/dL    AST 40 (H) 15 - 37 U/L    ALT 46 12 - 78 U/L    Alk Phosphatase 55 45 - 117 U/L    Total Protein 6.8 6.4 - 8.2 g/dL    Albumin 3.5 3.5 - 5.0 g/dL    Globulin 3.3 2.0 - 4.0 g/dL    Albumin/Globulin Ratio 1.1 1.1 - 2.2     CBC with Auto Differential    Collection Time: 02/07/25  5:26 AM   Result Value Ref Range    WBC 7.7 3.6 - 11.0 K/uL    RBC 4.08 3.80 - 5.20 M/uL    Hemoglobin 10.6 (L) 11.5 - 16.0 g/dL    Hematocrit 36.2 35.0 - 47.0 %    MCV 88.7 80.0 - 99.0 FL    MCH 26.0 26.0 - 34.0 PG    MCHC 29.3 (L) 30.0 - 36.5 g/dL    RDW 15.5 (H) 11.5 - 14.5 %    Platelets 303 150 - 400 K/uL    MPV 10.5 8.9 - 12.9 FL    Nucleated RBCs 0.0 0.0  WBC    nRBC 0.00 0.00 - 0.01 K/uL    Neutrophils % 62.2 32.0 - 75.0 %    Lymphocytes % 22.1 12.0 - 49.0 %    Monocytes % 12.5 5.0 - 13.0 %    Eosinophils % 2.2 0.0 - 7.0 %    Basophils % 0.7 0.0 - 1.0 %    Immature Granulocytes % 0.3 0 - 0.5 %    Neutrophils Absolute 4.76 1.80 - 8.00 K/UL    Lymphocytes

## 2025-02-10 VITALS
OXYGEN SATURATION: 97 % | HEART RATE: 67 BPM | SYSTOLIC BLOOD PRESSURE: 125 MMHG | DIASTOLIC BLOOD PRESSURE: 68 MMHG | WEIGHT: 172.4 LBS | TEMPERATURE: 97.9 F | HEIGHT: 60 IN | BODY MASS INDEX: 33.85 KG/M2 | RESPIRATION RATE: 18 BRPM

## 2025-02-10 LAB
ALBUMIN SERPL-MCNC: 3.1 G/DL (ref 3.5–5)
ALBUMIN/GLOB SERPL: 1.1 (ref 1.1–2.2)
ALP SERPL-CCNC: 45 U/L (ref 45–117)
ALT SERPL-CCNC: 28 U/L (ref 12–78)
ANION GAP SERPL CALC-SCNC: 4 MMOL/L (ref 2–12)
AST SERPL W P-5'-P-CCNC: 19 U/L (ref 15–37)
BASOPHILS # BLD: 0.03 K/UL (ref 0–0.1)
BASOPHILS NFR BLD: 0.5 % (ref 0–1)
BILIRUB SERPL-MCNC: 0.6 MG/DL (ref 0.2–1)
BUN SERPL-MCNC: 15 MG/DL (ref 6–20)
BUN/CREAT SERPL: 11 (ref 12–20)
CA-I BLD-MCNC: 10 MG/DL (ref 8.5–10.1)
CA-I BLD-MCNC: 10.9 MG/DL (ref 8.5–10.1)
CHLORIDE SERPL-SCNC: 106 MMOL/L (ref 97–108)
CO2 SERPL-SCNC: 28 MMOL/L (ref 21–32)
CREAT SERPL-MCNC: 1.31 MG/DL (ref 0.55–1.02)
CYTOLOGY-NON GYN: NORMAL
DIFFERENTIAL METHOD BLD: ABNORMAL
EOSINOPHIL # BLD: 0.42 K/UL (ref 0–0.4)
EOSINOPHIL NFR BLD: 6.3 % (ref 0–7)
ERYTHROCYTE [DISTWIDTH] IN BLOOD BY AUTOMATED COUNT: 16 % (ref 11.5–14.5)
GLOBULIN SER CALC-MCNC: 2.9 G/DL (ref 2–4)
GLUCOSE SERPL-MCNC: 94 MG/DL (ref 65–100)
HCT VFR BLD AUTO: 31.9 % (ref 35–47)
HGB BLD-MCNC: 9.5 G/DL (ref 11.5–16)
IMM GRANULOCYTES # BLD AUTO: 0.01 K/UL (ref 0–0.04)
IMM GRANULOCYTES NFR BLD AUTO: 0.2 % (ref 0–0.5)
LYMPHOCYTES # BLD: 1.67 K/UL (ref 0.8–3.5)
LYMPHOCYTES NFR BLD: 25.2 % (ref 12–49)
MAGNESIUM SERPL-MCNC: 2.3 MG/DL (ref 1.6–2.4)
MCH RBC QN AUTO: 26 PG (ref 26–34)
MCHC RBC AUTO-ENTMCNC: 29.8 G/DL (ref 30–36.5)
MCV RBC AUTO: 87.4 FL (ref 80–99)
MONOCYTES # BLD: 0.84 K/UL (ref 0–1)
MONOCYTES NFR BLD: 12.7 % (ref 5–13)
NEUTS SEG # BLD: 3.66 K/UL (ref 1.8–8)
NEUTS SEG NFR BLD: 55.1 % (ref 32–75)
NRBC # BLD: 0 K/UL (ref 0–0.01)
NRBC BLD-RTO: 0 PER 100 WBC
PHOSPHATE SERPL-MCNC: 4.2 MG/DL (ref 2.6–4.7)
PLATELET # BLD AUTO: 285 K/UL (ref 150–400)
PMV BLD AUTO: 10.7 FL (ref 8.9–12.9)
POTASSIUM SERPL-SCNC: 3.6 MMOL/L (ref 3.5–5.1)
PROT SERPL-MCNC: 6 G/DL (ref 6.4–8.2)
PTH-INTACT SERPL-MCNC: 126.8 PG/ML (ref 18.4–88)
RBC # BLD AUTO: 3.65 M/UL (ref 3.8–5.2)
SODIUM SERPL-SCNC: 138 MMOL/L (ref 136–145)
WBC # BLD AUTO: 6.6 K/UL (ref 3.6–11)

## 2025-02-10 PROCEDURE — 83735 ASSAY OF MAGNESIUM: CPT

## 2025-02-10 PROCEDURE — 84100 ASSAY OF PHOSPHORUS: CPT

## 2025-02-10 PROCEDURE — 94640 AIRWAY INHALATION TREATMENT: CPT

## 2025-02-10 PROCEDURE — 36415 COLL VENOUS BLD VENIPUNCTURE: CPT

## 2025-02-10 PROCEDURE — 94761 N-INVAS EAR/PLS OXIMETRY MLT: CPT

## 2025-02-10 PROCEDURE — 6370000000 HC RX 637 (ALT 250 FOR IP): Performed by: INTERNAL MEDICINE

## 2025-02-10 PROCEDURE — 84165 PROTEIN E-PHORESIS SERUM: CPT

## 2025-02-10 PROCEDURE — 80053 COMPREHEN METABOLIC PANEL: CPT

## 2025-02-10 PROCEDURE — 85025 COMPLETE CBC W/AUTO DIFF WBC: CPT

## 2025-02-10 RX ADMIN — IPRATROPIUM BROMIDE AND ALBUTEROL SULFATE 1 DOSE: 2.5; .5 SOLUTION RESPIRATORY (INHALATION) at 06:20

## 2025-02-10 RX ADMIN — SPIRONOLACTONE 25 MG: 25 TABLET ORAL at 08:29

## 2025-02-10 RX ADMIN — IPRATROPIUM BROMIDE AND ALBUTEROL SULFATE 1 DOSE: 2.5; .5 SOLUTION RESPIRATORY (INHALATION) at 14:39

## 2025-02-10 RX ADMIN — APIXABAN 2.5 MG: 2.5 TABLET, FILM COATED ORAL at 08:29

## 2025-02-10 RX ADMIN — ALLOPURINOL 100 MG: 100 TABLET ORAL at 08:29

## 2025-02-10 RX ADMIN — METOPROLOL SUCCINATE 75 MG: 25 TABLET, FILM COATED, EXTENDED RELEASE ORAL at 08:29

## 2025-02-10 RX ADMIN — CETIRIZINE HYDROCHLORIDE 10 MG: 10 TABLET, FILM COATED ORAL at 08:29

## 2025-02-10 NOTE — PROGRESS NOTES
Hospitalist Progress Note    NAME:   Concepción Maki   : 1945   MRN: [id number]     Date/Time: 2025 9:59 AM  Patient PCP: Torsten Herrera MD    Estimated discharge date:  Barriers:       Assessment / Plan:    Chest pain  Acute new onset decompensated heart failure  Dyspnea  Asthma exacerbation  Of note patient's shortness of breath has improved significantly, has been diuresing well, symptomatology likely secondary to pulmonary edema  With ABY, DC Lasix IV 20 Mg twice daily  Daily weights  Frequent intake and output monitoring  Echocardiogram with EF of 20% noted.  Trend cardiac enzymes  Continue current medications  Cardiology consult appreciated, continue to follow recommendations with new findings of EF of 20% noted     History of atrial fibrillation  Discontinue Cardizem CD given new findings of a EF of 20% noted.   Patient not on chronic anticoagulation.     ABY  CrCl progressively worsening over the last 48 hours.  Creatinine now up to 1.58 likely secondary to Lasix IV twice daily which improved patient clinically significantly.  Discontinue IV Lasix.  Continue low-dose lisinopril for now with EF of 20%.  Obtain nephrology consultation along with awaiting further recommendations per cardiology.    Hypertension  continue current antihypertensive medications     Chronic gout  continue medications     Vitamin D deficiency  continue medications     Prophylaxis  Lovenox    Full code      Medical Decision Making:   I personally reviewed labs: BMP, CBC  I personally reviewed imaging:  I personally reviewed EKG:  Toxic drug monitoring:   Discussed case with: Patient, son at bedside, nursing    Total time 45 minutes.    Subjective:     Chief Complaint / Reason for Physician Visit  \" Shortness of breath\".  Discussed with RN events overnight.      --  Admission H&P   79 year old female, with a history of premature coronary artery disease (first myocardial infarction occurred in ), previous 
      Hospitalist Progress Note    NAME:   Concepción Maki   : 1945   MRN: [id number]     Date/Time: 2025 10:50 AM  Patient PCP: Torsten Herrera MD    Estimated discharge date:  Barriers:       Assessment / Plan:    Chest pain  Acute new onset decompensated heart failure  Dyspnea  Asthma exacerbation  Of note patient's shortness of breath has improved significantly, has been diuresing well, symptomatology likely secondary to pulmonary edema  Continue Lasix IV 20 Mg twice daily  Daily weights  Frequent intake and output monitoring  Echocardiogram with EF of 20% noted.  Trend cardiac enzymes  Continue current medications  Cardiology consult appreciated, continue to follow recommendations with new findings of EF of 20% noted     History of atrial fibrillation  Discontinue Cardizem CD given new findings of a EF of 20% noted.   Patient not on chronic anticoagulation.     Hypertension  continue current antihypertensive medications     Chronic gout  continue medications     Vitamin D deficiency  continue medications     Prophylaxis  Lovenox    Full code      Medical Decision Making:   I personally reviewed labs: BMP, CBC  I personally reviewed imaging:  I personally reviewed EKG:  Toxic drug monitoring:   Discussed case with: Patient, son at bedside, nursing    Subjective:     Chief Complaint / Reason for Physician Visit  \" Shortness of breath\".  Discussed with RN events overnight.      --  Admission H&P   79 year old female, with a history of premature coronary artery disease (first myocardial infarction occurred in ), previous CABG, pacemaker in situ and chronic atrial fibrillation who presents to the hospital with 48hrs or progressively worsening shortness of breath, which she attributed to an asthma exacerbation.  She had been using her oral inhaler on a frequent basis (admitting more than prescribed) with minimal improvement.  This morning, she developed back pain, which for her has been an 
  Physician Progress Note      PATIENT:               ZAHRAA BRYSON  CSN #:                  [id number]  :                       1945  ADMIT DATE:       2025 1:04 PM  DISCH DATE:  RESPONDING  PROVIDER #:        Prashant Pena MD          QUERY TEXT:    Patient admitted with CHF exacerbation, noted to have atrial fibrillation and   is maintained on Eliquis. If possible, please document in progress notes and   discharge summary if you are evaluating and/or treating any of the following:?  ?  The medical record reflects the following:  Risk Factors: HTN, 79-year-old female  Clinical Indicators: admitted with CHF exacerbation  - noted to have chronic atrial fibrillation and maintained on Eliquis  Treatment: Eliquis 2.5 mg 2 times daily  Options provided:  -- Secondary hypercoagulable state in a patient with atrial fibrillation  -- Other - I will add my own diagnosis  -- Disagree - Not applicable / Not valid  -- Disagree - Clinically unable to determine / Unknown  -- Refer to Clinical Documentation Reviewer    PROVIDER RESPONSE TEXT:    This patient has secondary hypercoagulable state in a patient with atrial   fibrillation.    Query created by: Rosie Vidales on 2/10/2025 11:01 AM      Electronically signed by:  Prashant Pena MD 2/10/2025 2:53 PM          
.echo  
4 Eyes Skin Assessment     NAME:  Concepción Maki  YOB: 1945  MEDICAL RECORD NUMBER:  [id number]    The patient is being assessed for  Admission    I agree that at least one RN has performed a thorough Head to Toe Skin Assessment on the patient. ALL assessment sites listed below have been assessed.      Areas assessed by both nurses:    Head, Face, Ears, Shoulders, Back, Chest, Arms, Elbows, Hands, Sacrum. Buttock, Coccyx, Ischium, Legs. Feet and Heels, and Under Medical Devices         Does the Patient have a Wound? No noted wound(s), Scab noted on left shin       Gordo Prevention initiated by RN: No  Wound Care Orders initiated by RN: No    Pressure Injury (Stage 3,4, Unstageable, DTI, NWPT, and Complex wounds) if present, place Wound referral order by RN under : No    New Ostomies, if present place, Ostomy referral order under : No     Nurse 1 eSignature: Electronically signed by Noelle Guadarrama RN on 2/6/25 at 6:36 PM EST    **SHARE this note so that the co-signing nurse can place an eSignature**    Nurse 2 eSignature: Electronically signed by Sylvia Schafer RN on 2/6/25 at 6:38 PM EST   
Hospitalist Progress Note    NAME: Concepción Maki   :  1945   MRN:  [id number]            Subjective:     Chief Complaint / Reason for Physician Visit shortness of breath  Patient seen and evaluated at bedside, overnight events reviewed, patient currently has no new complaints, shortness of breath has improved.  Discussed with RN events overnight.     Review of Systems:  Symptom Y/N Comments  Symptom Y/N Comments   Fever/Chills N   Chest Pain N    Poor Appetite Y   Edema N    Cough Y   Abdominal Pain N    Sputum Y   Joint Pain N    SOB/BRANNON Y   Pruritis/Rash N    Nausea/vomit N   Tolerating PT/OT NA    Diarrhea N   Tolerating Diet Y    Constipation N   Other       Could NOT obtain due to:      Objective:     VITALS:   Last 24hrs VS reviewed since prior progress note. Most recent are:  [unfilled]    Intake/Output Summary (Last 24 hours) at 2025 1106  Last data filed at 2025 0030  Gross per 24 hour   Intake 130 ml   Output --   Net 130 ml        PHYSICAL EXAM:  General: Patient appears comfortable   EENT:  EOMI. Anicteric sclerae. MMM  Resp:  CTA bilaterally, no wheezing or rales.  No accessory muscle use  CV:  Regular  rhythm, s1/s2 no m/r/g No edema  GI:  Soft, Non distended, Non tender.  +Bowel sounds  Neurologic:  Alert and oriented X 3, normal speech,   Psych:   Good insight. Not anxious nor agitated  Skin:  No rashes.  No jaundice    Procedures: see electronic medical records for all procedures/Xrays and details which were not copied into this note but were reviewed prior to creation of Plan.      LABS:  I reviewed today's most current labs and imaging studies.  Pertinent labs include:  Recent Labs     25  1309 25  0526   WBC 8.5 7.7   HGB 10.5* 10.6*   HCT 35.6 36.2    303     Recent Labs     25  1309 25  1845 25  0526    141 143   K 3.4* 4.7 3.3*   * 112* 107   CO2 25 22 27   BUN 10 9 8   MG 2.0  --  2.2   ALT  --  46 46       Signed: Trudy Thomas 
Received Order for Telemetry     Concepción Maki   1945   [id number]   Chest pain [R07.9]   Epifanio Short MD     Tele Box # [id number] placed on patient at  1732 pm  ER Room # 7  Admitting to Room 481  Verified with Primary Nurse QUANG at  1732 pm    
Renal Progress Note    Patient: Concepción Maki MRN: [id number]  SSN: [id number]    YOB: 1945  Age: 79 y.o.  Sex: female      Admit Date: 2/6/2025    LOS: 3 days     Subjective:   Patient seen at bedside. No active complaints today. Tells me that she is being discharged.    Creatinine improved to 1.3, electrolytes stable.    Current Facility-Administered Medications   Medication Dose Route Frequency    apixaban (ELIQUIS) tablet 2.5 mg  2.5 mg Oral BID    ipratropium 0.5 mg-albuterol 2.5 mg (DUONEB) nebulizer solution 1 Dose  1 Dose Inhalation Q6H WA RT    ipratropium 0.5 mg-albuterol 2.5 mg (DUONEB) nebulizer solution 1 Dose  1 Dose Inhalation Q4H PRN    allopurinol (ZYLOPRIM) tablet 100 mg  100 mg Oral Daily    clopidogrel (PLAVIX) tablet 75 mg  75 mg Oral Nightly    [Held by provider] dilTIAZem (CARDIZEM CD) extended release capsule 120 mg  120 mg Oral Daily    famotidine (PEPCID) tablet 20 mg  20 mg Oral Nightly PRN    cetirizine (ZYRTEC) tablet 10 mg  10 mg Oral Daily    [Held by provider] lisinopril (PRINIVIL;ZESTRIL) tablet 10 mg  10 mg Oral Daily    metoprolol succinate (TOPROL XL) extended release tablet 75 mg  75 mg Oral BID    spironolactone (ALDACTONE) tablet 25 mg  25 mg Oral Daily    [Held by provider] Vitamin D (CHOLECALCIFEROL) tablet 1,000 Units  1,000 Units Oral Daily    nitroGLYCERIN (NITROSTAT) SL tablet 0.4 mg  0.4 mg SubLINGual Q5 Min PRN    sodium chloride flush 0.9 % injection 5-40 mL  5-40 mL IntraVENous 2 times per day    sodium chloride flush 0.9 % injection 5-40 mL  5-40 mL IntraVENous PRN    0.9 % sodium chloride infusion   IntraVENous PRN    potassium chloride (KLOR-CON M) extended release tablet 40 mEq  40 mEq Oral PRN    Or    potassium bicarb-citric acid (EFFER-K) effervescent tablet 40 mEq  40 mEq Oral PRN    Or    potassium chloride 10 mEq/100 mL IVPB (Peripheral Line)  10 mEq IntraVENous PRN    magnesium sulfate 2000 mg in 50 mL IVPB premix  2,000 mg IntraVENous 
VSS. Patient discharged home self care. Discharge instructions given to patient at bedside. IV and telemetry discontinued.  
need a postprocedural chest x-ray.  Of note, the patient follows in our clinic with Dr. Kumar, karla on a as needed Combivent inhaler.  No role for systemic steroids or antibiotics at this time, doubt asthma exacerbation.  Hopefully home tomorrow, TTE pending.     2/10/2025:    Patient seen and examined  Overnight events noted    Sitting up in the recliner comfortably  Awake and alert  On room air  No acute distress    Current Facility-Administered Medications   Medication Dose Route Frequency Provider Last Rate Last Admin    apixaban (ELIQUIS) tablet 2.5 mg  2.5 mg Oral BID Prashant Pena MD   2.5 mg at 02/10/25 0829    ipratropium 0.5 mg-albuterol 2.5 mg (DUONEB) nebulizer solution 1 Dose  1 Dose Inhalation Q6H WA RT Kirill Yanes DO   1 Dose at 02/10/25 0620    ipratropium 0.5 mg-albuterol 2.5 mg (DUONEB) nebulizer solution 1 Dose  1 Dose Inhalation Q4H PRN Epifanio Short MD        allopurinol (ZYLOPRIM) tablet 100 mg  100 mg Oral Daily Epifanio Short MD   100 mg at 02/10/25 0829    clopidogrel (PLAVIX) tablet 75 mg  75 mg Oral Nightly Epifanio Short MD   75 mg at 02/09/25 2042    [Held by provider] dilTIAZem (CARDIZEM CD) extended release capsule 120 mg  120 mg Oral Daily Epifanio Short MD   120 mg at 02/06/25 1812    famotidine (PEPCID) tablet 20 mg  20 mg Oral Nightly PRN Epifanio Short MD        cetirizine (ZYRTEC) tablet 10 mg  10 mg Oral Daily Epifanio Short MD   10 mg at 02/10/25 0829    [Held by provider] lisinopril (PRINIVIL;ZESTRIL) tablet 10 mg  10 mg Oral Daily Epifanio Short MD   10 mg at 02/09/25 0920    metoprolol succinate (TOPROL XL) extended release tablet 75 mg  75 mg Oral BID Epifanio Short MD   75 mg at 02/10/25 0829    spironolactone (ALDACTONE) tablet 25 mg  25 mg Oral Daily Epifanio Short MD   25 mg at 02/10/25 0829    [Held by provider] Vitamin D (CHOLECALCIFEROL) tablet 1,000 Units  1,000 Units Oral Daily Epifanio Short MD   1,000 Units at 02/09/25 0918    nitroGLYCERIN 
Exam:    Last 24hrs VS reviewed since prior progress note. Most recent are:    /67   Pulse 81   Temp 98.1 °F (36.7 °C) (Oral)   Resp 20   Ht 1.524 m (5')   Wt 78.2 kg (172 lb 6.4 oz)   SpO2 98%   BMI 33.67 kg/m²   No intake or output data in the 24 hours ending 02/10/25 0759     General Appearance: Alert; no acute distress.  Ears/Nose/Mouth/Throat: moist mucous membranes  Neck: Supple.  Chest: Lungs clear to auscultation bilaterally.  Cardiovascular: Regular rate and rhythm, S1S2 normal  Abdomen: Soft, non-tender, bowel sounds are active.  Extremities: No edema bilaterally.  Skin: Warm and dry.      PMH/SH reviewed - no change compared to H&P    Telemetry: Sinus rhythm    EKG: Sinus rhythm with occasional PVCs.  No acute ST-T wave changes consistent with ischemia or injury pattern.    Lab Data Personally Reviewed:    Recent Labs     02/09/25  0731 02/10/25  0537   WBC 7.9 6.6   HGB 11.1* 9.5*   HCT 37.2 31.9*    285     No results for input(s): \"INR\", \"APTT\" in the last 72 hours.    Invalid input(s): \"PTP\"   Recent Labs     02/08/25  0453 02/09/25  0731 02/10/25  0537    136 138   K 3.8 4.2 3.6    103 106   CO2 27 29 28   BUN 10 14 15   MG 2.1 2.4 2.3     No results for input(s): \"CPK\" in the last 72 hours.    Invalid input(s): \"CPKMB\", \"CKNDX\", \"TROIQ\"  No results found for: \"CHOL\", \"CHLST\", \"CHOLV\", \"HDL\", \"HDLC\", \"LDL\", \"LDLC\"    Recent Labs     02/08/25  0453 02/09/25  0731 02/10/25  0537   GLOB 3.0 3.5 2.9     No results for input(s): \"PH\", \"PCO2\", \"PO2\" in the last 72 hours.    Medications Personally Reviewed:    Current Facility-Administered Medications   Medication Dose Route Frequency    apixaban (ELIQUIS) tablet 2.5 mg  2.5 mg Oral BID    ipratropium 0.5 mg-albuterol 2.5 mg (DUONEB) nebulizer solution 1 Dose  1 Dose Inhalation Q6H WA RT    ipratropium 0.5 mg-albuterol 2.5 mg (DUONEB) nebulizer solution 1 Dose  1 Dose Inhalation Q4H PRN    allopurinol (ZYLOPRIM) tablet 100 mg 
with the nursing staff.  More than 30 minutes spent with patient care.  Chart, labs and records were reviewed.    Comfort Mix MD  [facility] (PAT)  2/8/2025  12:28 PM

## 2025-02-10 NOTE — CARE COORDINATION
Transition of Care Plan:    RUR: 12%  Prior Level of Functioning: independent  Disposition: home self  SAURABH:   If SNF or IPR: Date FOC offered:   Date FOC received:   Accepting facility:   Date authorization started with reference number:   Date authorization received and expires:   Follow up appointments:   DME needed:   Transportation at discharge: patient arranged  IM/IMM Medicare/[place] letter given: previously given  Is patient a  and connected with [facility]?    If yes, was [place] transfer form completed and [facility] notified?   Caregiver Contact:   Discharge Caregiver contacted prior to discharge? Patient aware  Care Conference needed?   Barriers to discharge: n/a

## 2025-02-10 NOTE — DISCHARGE SUMMARY
Discharge Summary    Name: Concepción Maki  [id number]  YOB: 1945 (Age: 79 y.o.)   Date of Admission: 2/6/2025  Date of Discharge: 2/10/2025  Attending Physician: Prashant Pena MD    Discharge Diagnosis:     Consultations:  IP CONSULT TO CARDIOLOGY  IP CONSULT TO PULMONOLOGY  IP CONSULT TO NEPHROLOGY      Brief Admission History/Reason for Admission Per Trudy Greene MD:     Chief Complaint / Reason for Physician Visit  \" Shortness of breath\".  Discussed with RN events overnight.      2/7 --  Admission H&P   79 year old female, with a history of premature coronary artery disease (first myocardial infarction occurred in 1961), previous CABG, pacemaker in situ and chronic atrial fibrillation who presents to the hospital with 48hrs or progressively worsening shortness of breath, which she attributed to an asthma exacerbation.  She had been using her oral inhaler on a frequent basis (admitting more than prescribed) with minimal improvement.  This morning, she developed back pain, which for her has been an anginal-equivalent in the past and decided to seek medical attention.  Here, she was noted to be in atrial fibrillation, with a rapid ventricular rate.       Patient admits to cough, non-productive.  No fever or chills.  No abdominal pain, nausea or vomiting.     Workup in the E.R. was significant for a troponin leak, high sensitivity troponin was initially 22, with repeat 74, which was likely a supply/demand mismatch in the setting of tachycardia.        2/9  Patient is alert oriented x 4 and sitting at bedside chair, not requiring any supplemental oxygen.  Son again at bedside.  No overnight acute events, chest pain or shortness of breath noted.     Counseled on new findings on echocardiogram with a EF of 20 % and significantly reduced left ventricular systolic function noted.  Awaiting cardiology recommendations however has not been seen by cardiology 
breath improved, patient's cardiac enzymes were trended, patient had a transthoracic echo, following which after clearance by cardiology and after medication optimization by cardiology patient was deemed stable for discharge and close outpatient follow-up with primary care physician as well as cardiology, the plan was explained to the patient in details agreeable to current plan.        _______________________________________________________________________  Patient seen and examined by me on discharge day.  Pertinent Findings:  Gen:    Not in distress  Chest: Clear lungs  CVS: Irregularly irregular, S1 plus S2, no murmurs rubs gallops no edema  Abd:  Soft, not distended, not tender  Neuro:  Alert, oriented x 4  _______________________________________________________________________  DISCHARGE MEDICATIONS:      Medication List        START taking these medications      dilTIAZem 30 MG tablet  Commonly known as: CARDIZEM  Take 1 tablet by mouth 4 times daily     furosemide 20 MG tablet  Commonly known as: Lasix  Take 1 tablet by mouth daily            CHANGE how you take these medications      metoprolol succinate 25 MG extended release tablet  Commonly known as: TOPROL XL  Take 3 tablets by mouth 2 times daily  What changed: medication strength            CONTINUE taking these medications      allopurinol 100 MG tablet  Commonly known as: ZYLOPRIM     aspirin 81 MG EC tablet     clopidogrel 75 MG tablet  Commonly known as: PLAVIX     Combivent Respimat  MCG/ACT Aers inhaler  Generic drug: albuterol-ipratropium     Dialyvite 800-Zinc 15 0.8 MG Tabs     dilTIAZem 120 MG extended release capsule  Commonly known as: CARDIZEM CD     famotidine 20 MG tablet  Commonly known as: PEPCID     fexofenadine 180 MG tablet  Commonly known as: ALLEGRA     lisinopril 10 MG tablet  Commonly known as: PRINIVIL;ZESTRIL     spironolactone 25 MG tablet  Commonly known as: ALDACTONE     vitamin D 25 MCG (1000 UT) Caps

## 2025-02-10 NOTE — PLAN OF CARE
Problem: Discharge Planning  Goal: Discharge to home or other facility with appropriate resources  Outcome: Progressing  Flowsheets  Taken 2/10/2025 0753 by Noelle Guadarrama RN  Discharge to home or other facility with appropriate resources: Identify barriers to discharge with patient and caregiver  Taken 2/9/2025 2025 by Keren Dejesus, RN  Discharge to home or other facility with appropriate resources: Identify barriers to discharge with patient and caregiver     Problem: Pain  Goal: Verbalizes/displays adequate comfort level or baseline comfort level  Outcome: Progressing     Problem: Safety - Adult  Goal: Free from fall injury  2/10/2025 0757 by Noelle Guadarrama RN  Outcome: Progressing  2/10/2025 0151 by Keren Dejesus, RN  Outcome: Progressing     Problem: ABCDS Injury Assessment  Goal: Absence of physical injury  Outcome: Progressing

## 2025-02-10 NOTE — CARE COORDINATION
Chart reviewed, DCP remains for patient to d/c from [facility] to home with spouse once medically stable, no current CM needs at this time, declined .    [facility] continues to follow and monitor for needs.

## 2025-02-11 LAB
BACTERIA SPEC CULT: NORMAL
GRAM STN SPEC: NORMAL
Lab: NORMAL

## 2025-02-13 LAB
ALBUMIN SERPL ELPH-MCNC: 3.2 G/DL (ref 2.9–4.4)
ALBUMIN/GLOB SERPL: 1.1 (ref 0.7–1.7)
ALPHA1 GLOB SERPL ELPH-MCNC: 0.3 G/DL (ref 0–0.4)
ALPHA2 GLOB SERPL ELPH-MCNC: 0.9 G/DL (ref 0.4–1)
B-GLOBULIN SERPL ELPH-MCNC: 1.1 G/DL (ref 0.7–1.3)
GAMMA GLOB SERPL ELPH-MCNC: 0.5 G/DL (ref 0.4–1.8)
GLOBULIN SER CALC-MCNC: 2.8 G/DL (ref 2.2–3.9)
M PROTEIN SERPL ELPH-MCNC: NORMAL G/DL
PROT SERPL-MCNC: 6 G/DL (ref 6–8.5)